# Patient Record
Sex: FEMALE | Race: WHITE | NOT HISPANIC OR LATINO | Employment: PART TIME | URBAN - METROPOLITAN AREA
[De-identification: names, ages, dates, MRNs, and addresses within clinical notes are randomized per-mention and may not be internally consistent; named-entity substitution may affect disease eponyms.]

---

## 2020-11-17 LAB
HBV SURFACE AG SERPL QL IA: NORMAL
HIV 1+2 AB+HIV1 P24 AG SERPL QL IA: NORMAL
RUBELLA ANTIBODY IGG QUANTITATIVE: NORMAL IU/ML

## 2021-03-23 ENCOUNTER — HOSPITAL ENCOUNTER (EMERGENCY)
Facility: CLINIC | Age: 31
End: 2021-03-23

## 2021-03-23 ENCOUNTER — APPOINTMENT (OUTPATIENT)
Dept: ULTRASOUND IMAGING | Facility: CLINIC | Age: 31
End: 2021-03-23
Attending: OBSTETRICS & GYNECOLOGY
Payer: COMMERCIAL

## 2021-03-23 ENCOUNTER — HOSPITAL ENCOUNTER (OUTPATIENT)
Facility: CLINIC | Age: 31
End: 2021-03-23
Admitting: OBSTETRICS & GYNECOLOGY
Payer: COMMERCIAL

## 2021-03-23 ENCOUNTER — HOSPITAL ENCOUNTER (OUTPATIENT)
Facility: CLINIC | Age: 31
Discharge: HOME | End: 2021-03-23
Attending: OBSTETRICS & GYNECOLOGY | Admitting: OBSTETRICS & GYNECOLOGY
Payer: COMMERCIAL

## 2021-03-23 VITALS
SYSTOLIC BLOOD PRESSURE: 97 MMHG | WEIGHT: 130 LBS | TEMPERATURE: 98.8 F | RESPIRATION RATE: 18 BRPM | DIASTOLIC BLOOD PRESSURE: 58 MMHG | HEART RATE: 78 BPM

## 2021-03-23 PROBLEM — Z36.89 ENCOUNTER FOR TRIAGE IN PREGNANT PATIENT: Status: ACTIVE | Noted: 2021-03-23

## 2021-03-23 LAB
ALBUMIN UR-MCNC: NEGATIVE MG/DL
APPEARANCE UR: CLEAR
BACTERIA #/AREA URNS HPF: ABNORMAL /HPF
BILIRUB UR QL STRIP: NEGATIVE
COLOR UR AUTO: ABNORMAL
GLUCOSE UR STRIP-MCNC: NEGATIVE MG/DL
HGB UR QL STRIP: NEGATIVE
KETONES UR STRIP-MCNC: 10 MG/DL
LEUKOCYTE ESTERASE UR QL STRIP: ABNORMAL
NITRATE UR QL: NEGATIVE
PH UR STRIP: 7 PH (ref 5–7)
RBC #/AREA URNS AUTO: 1 /HPF (ref 0–2)
SOURCE: ABNORMAL
SP GR UR STRIP: 1.01 (ref 1–1.03)
UROBILINOGEN UR STRIP-MCNC: NORMAL MG/DL (ref 0–2)
WBC #/AREA URNS AUTO: 4 /HPF (ref 0–5)

## 2021-03-23 PROCEDURE — 999N000105 HC STATISTIC NO DOCUMENTATION TO SUPPORT CHARGE

## 2021-03-23 PROCEDURE — 258N000003 HC RX IP 258 OP 636: Performed by: OBSTETRICS & GYNECOLOGY

## 2021-03-23 PROCEDURE — 81001 URINALYSIS AUTO W/SCOPE: CPT | Performed by: OBSTETRICS & GYNECOLOGY

## 2021-03-23 PROCEDURE — 96360 HYDRATION IV INFUSION INIT: CPT

## 2021-03-23 PROCEDURE — 96374 THER/PROPH/DIAG INJ IV PUSH: CPT

## 2021-03-23 PROCEDURE — 250N000011 HC RX IP 250 OP 636: Performed by: OBSTETRICS & GYNECOLOGY

## 2021-03-23 PROCEDURE — 76705 ECHO EXAM OF ABDOMEN: CPT

## 2021-03-23 PROCEDURE — G0463 HOSPITAL OUTPT CLINIC VISIT: HCPCS | Mod: 25

## 2021-03-23 RX ORDER — SODIUM CHLORIDE, SODIUM LACTATE, POTASSIUM CHLORIDE, CALCIUM CHLORIDE 600; 310; 30; 20 MG/100ML; MG/100ML; MG/100ML; MG/100ML
INJECTION, SOLUTION INTRAVENOUS CONTINUOUS
Status: DISCONTINUED | OUTPATIENT
Start: 2021-03-23 | End: 2021-03-24 | Stop reason: HOSPADM

## 2021-03-23 RX ORDER — POLYETHYLENE GLYCOL 3350 17 G/17G
1 POWDER, FOR SOLUTION ORAL DAILY
COMMUNITY

## 2021-03-23 RX ORDER — ONDANSETRON 2 MG/ML
4 INJECTION INTRAMUSCULAR; INTRAVENOUS EVERY 6 HOURS PRN
Status: DISCONTINUED | OUTPATIENT
Start: 2021-03-23 | End: 2021-03-24 | Stop reason: HOSPADM

## 2021-03-23 RX ORDER — PRENATAL VIT/IRON FUM/FOLIC AC 27MG-0.8MG
1 TABLET ORAL DAILY
COMMUNITY
End: 2024-08-08

## 2021-03-23 RX ADMIN — SODIUM CHLORIDE, POTASSIUM CHLORIDE, SODIUM LACTATE AND CALCIUM CHLORIDE 1000 ML: 600; 310; 30; 20 INJECTION, SOLUTION INTRAVENOUS at 21:00

## 2021-03-23 RX ADMIN — ONDANSETRON 4 MG: 2 INJECTION INTRAMUSCULAR; INTRAVENOUS at 21:00

## 2021-03-23 SDOH — HEALTH STABILITY: MENTAL HEALTH: HOW OFTEN DO YOU HAVE A DRINK CONTAINING ALCOHOL?: NEVER

## 2021-03-24 NOTE — PLAN OF CARE
Data: Patient assessed in the [facility] for abdominal pain.  Cervical exam not examined.  Membranes intact.  Contractions none.  Action:  Presumed adequate fetal oxygenation documented (see flow record). Discharge instructions reviewed.  Patient instructed to report change in fetal movement, vaginal leaking of fluid or bleeding, abdominal pain, or any concerns related to the pregnancy to her nurse/physician.    Response: Orders to discharge home per Moody Green.  Patient verbalized understanding of education and verbalized agreement with plan. Discharged to home.

## 2021-03-24 NOTE — PROVIDER NOTIFICATION
03/23/21 2200   Provider Notification   Provider Name/Title Dr. Green   Method of Notification Phone   Request Evaluate - Remote   Notification Reason Lab/Diagnostic Study   MD notified of UA and ultrasound results and updated that pt is feeling a lot better.  Iv fluids are running and zofran was given.  Orders received that after IV fluids pt may be discharged to home and to follow up this week in the clinic.

## 2021-03-24 NOTE — PROVIDER NOTIFICATION
21   Provider Notification   Provider Name/Title Dr. Green   Method of Notification Phone   Request Evaluate - Remote   Notification Reason Patient Arrived;Pain   Data: Patient presented to [facility]: 3/23/2021  7:05 PM.  Reason for maternal/fetal assessment is abdominal pain. Patient reports that she started having really bad abdominal pain around 3:30pm and has thrown up about 6 times.  Pt states that she had some gallbladder issues with her last pregnancy around 7 months.  Pt states that about 4 weeks ago she mentioned to the doctor that she would have abdominal pain after eating..  Patient is a .  Prenatal record reviewed. Pregnancy has been uncomplicated..  Gestational Age 28w1d. VSS. Fetal movement present. Patient denies uterine contractions, leaking of vaginal fluid/rupture of membranes, vaginal bleeding, pelvic pressure, headache, visual disturbances, significant edema. Support person is present.   Action: Verbal consent for EFM. Triage assessment completed. Bill of rights reviewed.  Response: Patient verbalized agreement with plan. Will contact Dr Moody Green with update and further orders.  Orders received to do a Abdominal Ultrasound of RUQ, give a liter of fluids and some iv zofran and update with results.

## 2021-05-17 LAB — GROUP B STREP PCR: NEGATIVE

## 2021-05-24 ENCOUNTER — HOSPITAL ENCOUNTER (OUTPATIENT)
Facility: CLINIC | Age: 31
Discharge: HOME | End: 2021-05-24
Attending: OBSTETRICS & GYNECOLOGY | Admitting: OBSTETRICS & GYNECOLOGY
Payer: COMMERCIAL

## 2021-05-24 VITALS — RESPIRATION RATE: 16 BRPM | TEMPERATURE: 98.9 F

## 2021-05-24 LAB
ALBUMIN UR-MCNC: NEGATIVE MG/DL
APPEARANCE UR: CLEAR
BACTERIA #/AREA URNS HPF: ABNORMAL /HPF
BILIRUB UR QL STRIP: NEGATIVE
COLOR UR AUTO: ABNORMAL
GLUCOSE UR STRIP-MCNC: NEGATIVE MG/DL
HGB UR QL STRIP: NEGATIVE
KETONES UR STRIP-MCNC: NEGATIVE MG/DL
LEUKOCYTE ESTERASE UR QL STRIP: ABNORMAL
MUCOUS THREADS #/AREA URNS LPF: PRESENT /LPF
NITRATE UR QL: NEGATIVE
PH UR STRIP: 6 PH (ref 5–7)
RBC #/AREA URNS AUTO: 1 /HPF (ref 0–2)
SOURCE: ABNORMAL
SP GR UR STRIP: 1 (ref 1–1.03)
SQUAMOUS #/AREA URNS AUTO: 1 /HPF (ref 0–1)
UROBILINOGEN UR STRIP-MCNC: NORMAL MG/DL (ref 0–2)
WBC #/AREA URNS AUTO: 4 /HPF (ref 0–5)

## 2021-05-24 PROCEDURE — 59025 FETAL NON-STRESS TEST: CPT

## 2021-05-24 PROCEDURE — 87086 URINE CULTURE/COLONY COUNT: CPT | Performed by: OBSTETRICS & GYNECOLOGY

## 2021-05-24 PROCEDURE — 87186 SC STD MICRODIL/AGAR DIL: CPT | Performed by: OBSTETRICS & GYNECOLOGY

## 2021-05-24 PROCEDURE — G0463 HOSPITAL OUTPT CLINIC VISIT: HCPCS | Mod: 25

## 2021-05-24 PROCEDURE — 87088 URINE BACTERIA CULTURE: CPT | Performed by: OBSTETRICS & GYNECOLOGY

## 2021-05-24 PROCEDURE — 81001 URINALYSIS AUTO W/SCOPE: CPT | Performed by: OBSTETRICS & GYNECOLOGY

## 2021-05-24 NOTE — DISCHARGE INSTRUCTIONS
Discharge Instruction for Undelivered Patients      You were seen for: Back pain, leg pain/swelling, arm/hand pain and numbness. Decreased fetal movement.  We Consulted: Dr. Shea  You had (Test or Medicine):Fetal and uterine monitoring, urine assessment.      Diet:   Drink 8 to 12 glasses of liquids (milk, juice, water) every day.  You may eat meals and snacks.     Activity:  Count fetal kicks everyday (see handout)  Call your doctor or nurse midwife if your baby is moving less than usual.     Call your provider if you notice:  Swelling in your face or increased swelling in your hands or legs.  Headaches that are not relieved by Tylenol (acetaminophen).  Changes in your vision (blurring: seeing spots or stars.)  Nausea (sick to your stomach) and vomiting (throwing up).   Weight gain of 5 pounds or more per week.  Heartburn that doesn't go away.  Signs of bladder infection: pain when you urinate (use the toilet), need to go more often and more urgently.  The bag of carvajal (rupture of membranes) breaks, or you notice leaking in your underwear.  Bright red blood in your underwear.  Abdominal (lower belly) or stomach pain.  Second (plus) baby: Contractions (tightening) less than 10 minutes apart and getting stronger.  Increase or change in vaginal discharge (note the color and amount)      Follow-up:  Make an appointment to be seen this week, at this appointment your doctor with discuss physical therapy needs.   Use ice on wrists.  Have someone put on your compression stockings for you.  Purchase wrist splints from pharmacy or [employer] to wear for support until your appointment.

## 2021-05-24 NOTE — PLAN OF CARE
Data: Patient presented to [facility]: 2021 11:40 AM.  Reason for maternal/fetal assessment is back pain, leg pain, numbness in arms/hands, and decreased fetal movement. Patient reports constant centralized back pain that is causing shooting pain down her legs, experiencing pain and numbness in her arms/hands, and she is also experiencing swelling in her feet/ankles-this is noted to be +2-+3 pitting edema. Patient reports her baby is moving, just not as much as she is used to. Patient states she does have compression stockings but has been struggling with dexterity and hand pain due to the hand numbness when attempting to put these on. Patient is a .  Prenatal record reviewed. Pregnancy has been uncomplicated. Patient is currently being treated for a UTI, is on day 4 of antibiotics, back pain description is not consistent with flank pain.  Gestational Age 37w0d. VSS. Fetal movement decreased since this morning. Patient denies uterine contractions, leaking of vaginal fluid/rupture of membranes, vaginal bleeding, abdominal pain, pelvic pressure, nausea, vomiting, headache, visual disturbances, epigastric or URQ pain, significant edema. Support person is not present.   Action: Verbal consent for EFM. FHT's category 1. No UC's noted on monitor nor palpated. Triage assessment completed. Bill of rights reviewed.  Response: Patient verbalized agreement with plan. Dr. Shea updated on assessment data. Pain consistent with carpal tunnel, sciatica pain of pregnancy. Per MD, may discharge to home following reactive NST. Patient to make appointment for this week where she will be consulted with PT, patient to ice wrists, use wrist splints, and have someone put on her compression stockings for her in the meantime.

## 2021-05-25 LAB
BACTERIA SPEC CULT: ABNORMAL
Lab: ABNORMAL
SPECIMEN SOURCE: ABNORMAL

## 2021-06-01 ENCOUNTER — APPOINTMENT (OUTPATIENT)
Dept: INTERPRETER SERVICES | Facility: CLINIC | Age: 31
End: 2021-06-01
Payer: COMMERCIAL

## 2021-06-01 DIAGNOSIS — Z33.1 PREGNANCY, INCIDENTAL: Primary | ICD-10-CM

## 2021-06-03 DIAGNOSIS — Z33.1 PREGNANCY, INCIDENTAL: ICD-10-CM

## 2021-06-03 LAB
LABORATORY COMMENT REPORT: NORMAL
SARS-COV-2 RNA RESP QL NAA+PROBE: NEGATIVE
SARS-COV-2 RNA RESP QL NAA+PROBE: NORMAL
SPECIMEN SOURCE: NORMAL
SPECIMEN SOURCE: NORMAL

## 2021-06-03 PROCEDURE — U0005 INFEC AGEN DETEC AMPLI PROBE: HCPCS | Performed by: OBSTETRICS & GYNECOLOGY

## 2021-06-03 PROCEDURE — U0003 INFECTIOUS AGENT DETECTION BY NUCLEIC ACID (DNA OR RNA); SEVERE ACUTE RESPIRATORY SYNDROME CORONAVIRUS 2 (SARS-COV-2) (CORONAVIRUS DISEASE [COVID-19]), AMPLIFIED PROBE TECHNIQUE, MAKING USE OF HIGH THROUGHPUT TECHNOLOGIES AS DESCRIBED BY CMS-2020-01-R: HCPCS | Performed by: OBSTETRICS & GYNECOLOGY

## 2021-06-07 ENCOUNTER — ANESTHESIA EVENT (OUTPATIENT)
Dept: OBGYN | Facility: CLINIC | Age: 31
End: 2021-06-07
Payer: COMMERCIAL

## 2021-06-07 ENCOUNTER — ANESTHESIA (OUTPATIENT)
Dept: OBGYN | Facility: CLINIC | Age: 31
End: 2021-06-07
Payer: COMMERCIAL

## 2021-06-07 ENCOUNTER — HOSPITAL ENCOUNTER (INPATIENT)
Facility: CLINIC | Age: 31
LOS: 2 days | Discharge: HOME | End: 2021-06-09
Attending: OBSTETRICS & GYNECOLOGY | Admitting: OBSTETRICS & GYNECOLOGY
Payer: COMMERCIAL

## 2021-06-07 LAB
ABO + RH BLD: NORMAL
ABO + RH BLD: NORMAL
HGB BLD-MCNC: 13.8 G/DL (ref 11.7–15.7)
SPECIMEN EXP DATE BLD: NORMAL
T PALLIDUM AB SER QL: NONREACTIVE

## 2021-06-07 PROCEDURE — 86780 TREPONEMA PALLIDUM: CPT | Performed by: OBSTETRICS & GYNECOLOGY

## 2021-06-07 PROCEDURE — 258N000003 HC RX IP 258 OP 636: Performed by: OBSTETRICS & GYNECOLOGY

## 2021-06-07 PROCEDURE — 85018 HEMOGLOBIN: CPT | Performed by: OBSTETRICS & GYNECOLOGY

## 2021-06-07 PROCEDURE — 250N000009 HC RX 250: Performed by: OBSTETRICS & GYNECOLOGY

## 2021-06-07 PROCEDURE — 120N000001 HC R&B MED SURG/OB

## 2021-06-07 PROCEDURE — 86901 BLOOD TYPING SEROLOGIC RH(D): CPT | Performed by: OBSTETRICS & GYNECOLOGY

## 2021-06-07 PROCEDURE — 3E0R3BZ INTRODUCTION OF ANESTHETIC AGENT INTO SPINAL CANAL, PERCUTANEOUS APPROACH: ICD-10-PCS | Performed by: ANESTHESIOLOGY

## 2021-06-07 PROCEDURE — 86900 BLOOD TYPING SEROLOGIC ABO: CPT | Performed by: OBSTETRICS & GYNECOLOGY

## 2021-06-07 PROCEDURE — 250N000011 HC RX IP 250 OP 636: Performed by: ANESTHESIOLOGY

## 2021-06-07 PROCEDURE — 00HU33Z INSERTION OF INFUSION DEVICE INTO SPINAL CANAL, PERCUTANEOUS APPROACH: ICD-10-PCS | Performed by: ANESTHESIOLOGY

## 2021-06-07 PROCEDURE — 370N000003 HC ANESTHESIA WARD SERVICE

## 2021-06-07 RX ORDER — NALOXONE HYDROCHLORIDE 0.4 MG/ML
0.2 INJECTION, SOLUTION INTRAMUSCULAR; INTRAVENOUS; SUBCUTANEOUS
Status: DISCONTINUED | OUTPATIENT
Start: 2021-06-07 | End: 2021-06-08

## 2021-06-07 RX ORDER — TRANEXAMIC ACID 10 MG/ML
1 INJECTION, SOLUTION INTRAVENOUS EVERY 30 MIN PRN
Status: DISCONTINUED | OUTPATIENT
Start: 2021-06-07 | End: 2021-06-08

## 2021-06-07 RX ORDER — ONDANSETRON 4 MG/1
4 TABLET, ORALLY DISINTEGRATING ORAL EVERY 6 HOURS PRN
Status: DISCONTINUED | OUTPATIENT
Start: 2021-06-07 | End: 2021-06-08

## 2021-06-07 RX ORDER — METHYLERGONOVINE MALEATE 0.2 MG/ML
200 INJECTION INTRAVENOUS
Status: DISCONTINUED | OUTPATIENT
Start: 2021-06-07 | End: 2021-06-08

## 2021-06-07 RX ORDER — IBUPROFEN 800 MG/1
800 TABLET, FILM COATED ORAL
Status: COMPLETED | OUTPATIENT
Start: 2021-06-07 | End: 2021-06-08

## 2021-06-07 RX ORDER — BUPIVACAINE HYDROCHLORIDE 2.5 MG/ML
INJECTION, SOLUTION EPIDURAL; INFILTRATION; INTRACAUDAL PRN
Status: DISCONTINUED | OUTPATIENT
Start: 2021-06-07 | End: 2021-06-08

## 2021-06-07 RX ORDER — SODIUM CHLORIDE, SODIUM LACTATE, POTASSIUM CHLORIDE, CALCIUM CHLORIDE 600; 310; 30; 20 MG/100ML; MG/100ML; MG/100ML; MG/100ML
INJECTION, SOLUTION INTRAVENOUS CONTINUOUS
Status: DISCONTINUED | OUTPATIENT
Start: 2021-06-07 | End: 2021-06-08

## 2021-06-07 RX ORDER — ACETAMINOPHEN 325 MG/1
650 TABLET ORAL EVERY 4 HOURS PRN
Status: DISCONTINUED | OUTPATIENT
Start: 2021-06-07 | End: 2021-06-08

## 2021-06-07 RX ORDER — ONDANSETRON 2 MG/ML
4 INJECTION INTRAMUSCULAR; INTRAVENOUS EVERY 6 HOURS PRN
Status: DISCONTINUED | OUTPATIENT
Start: 2021-06-07 | End: 2021-06-08

## 2021-06-07 RX ORDER — OXYTOCIN/0.9 % SODIUM CHLORIDE 30/500 ML
100-340 PLASTIC BAG, INJECTION (ML) INTRAVENOUS CONTINUOUS PRN
Status: COMPLETED | OUTPATIENT
Start: 2021-06-07 | End: 2021-06-08

## 2021-06-07 RX ORDER — EPHEDRINE SULFATE 50 MG/ML
5 INJECTION, SOLUTION INTRAMUSCULAR; INTRAVENOUS; SUBCUTANEOUS
Status: DISCONTINUED | OUTPATIENT
Start: 2021-06-07 | End: 2021-06-08

## 2021-06-07 RX ORDER — FENTANYL/BUPIVACAINE/NS/PF 2-1250MCG
PLASTIC BAG, INJECTION (ML) INJECTION
Status: DISCONTINUED
Start: 2021-06-07 | End: 2021-06-08 | Stop reason: WASHOUT

## 2021-06-07 RX ORDER — CARBOPROST TROMETHAMINE 250 UG/ML
250 INJECTION, SOLUTION INTRAMUSCULAR
Status: DISCONTINUED | OUTPATIENT
Start: 2021-06-07 | End: 2021-06-08

## 2021-06-07 RX ORDER — OXYTOCIN/0.9 % SODIUM CHLORIDE 30/500 ML
1-24 PLASTIC BAG, INJECTION (ML) INTRAVENOUS CONTINUOUS
Status: DISCONTINUED | OUTPATIENT
Start: 2021-06-07 | End: 2021-06-08

## 2021-06-07 RX ORDER — FENTANYL/BUPIVACAINE/NS/PF 2-1250MCG
PLASTIC BAG, INJECTION (ML) INJECTION
Status: COMPLETED
Start: 2021-06-07 | End: 2021-06-08

## 2021-06-07 RX ORDER — FENTANYL CITRATE 50 UG/ML
50-100 INJECTION, SOLUTION INTRAMUSCULAR; INTRAVENOUS
Status: DISCONTINUED | OUTPATIENT
Start: 2021-06-07 | End: 2021-06-08

## 2021-06-07 RX ORDER — OXYTOCIN 10 [USP'U]/ML
10 INJECTION, SOLUTION INTRAMUSCULAR; INTRAVENOUS
Status: DISCONTINUED | OUTPATIENT
Start: 2021-06-07 | End: 2021-06-08

## 2021-06-07 RX ORDER — NALOXONE HYDROCHLORIDE 0.4 MG/ML
0.4 INJECTION, SOLUTION INTRAMUSCULAR; INTRAVENOUS; SUBCUTANEOUS
Status: DISCONTINUED | OUTPATIENT
Start: 2021-06-07 | End: 2021-06-08

## 2021-06-07 RX ORDER — EPHEDRINE SULFATE 50 MG/ML
INJECTION, SOLUTION INTRAMUSCULAR; INTRAVENOUS; SUBCUTANEOUS
Status: DISCONTINUED
Start: 2021-06-07 | End: 2021-06-08 | Stop reason: WASHOUT

## 2021-06-07 RX ORDER — NALBUPHINE HYDROCHLORIDE 10 MG/ML
2.5-5 INJECTION, SOLUTION INTRAMUSCULAR; INTRAVENOUS; SUBCUTANEOUS EVERY 6 HOURS PRN
Status: DISCONTINUED | OUTPATIENT
Start: 2021-06-07 | End: 2021-06-08

## 2021-06-07 RX ORDER — ONDANSETRON 2 MG/ML
4 INJECTION INTRAMUSCULAR; INTRAVENOUS EVERY 6 HOURS PRN
Status: DISCONTINUED | OUTPATIENT
Start: 2021-06-07 | End: 2021-06-07

## 2021-06-07 RX ORDER — OXYCODONE AND ACETAMINOPHEN 5; 325 MG/1; MG/1
1 TABLET ORAL
Status: DISCONTINUED | OUTPATIENT
Start: 2021-06-07 | End: 2021-06-08

## 2021-06-07 RX ADMIN — SODIUM CHLORIDE, POTASSIUM CHLORIDE, SODIUM LACTATE AND CALCIUM CHLORIDE 500 ML: 600; 310; 30; 20 INJECTION, SOLUTION INTRAVENOUS at 10:27

## 2021-06-07 RX ADMIN — SODIUM CHLORIDE, POTASSIUM CHLORIDE, SODIUM LACTATE AND CALCIUM CHLORIDE: 600; 310; 30; 20 INJECTION, SOLUTION INTRAVENOUS at 23:26

## 2021-06-07 RX ADMIN — SODIUM CHLORIDE, POTASSIUM CHLORIDE, SODIUM LACTATE AND CALCIUM CHLORIDE: 600; 310; 30; 20 INJECTION, SOLUTION INTRAVENOUS at 18:55

## 2021-06-07 RX ADMIN — SODIUM CHLORIDE, POTASSIUM CHLORIDE, SODIUM LACTATE AND CALCIUM CHLORIDE: 600; 310; 30; 20 INJECTION, SOLUTION INTRAVENOUS at 09:43

## 2021-06-07 RX ADMIN — Medication 2 MILLI-UNITS/MIN: at 09:57

## 2021-06-07 RX ADMIN — BUPIVACAINE HYDROCHLORIDE 15 ML: 2.5 INJECTION, SOLUTION EPIDURAL; INFILTRATION; INTRACAUDAL at 23:27

## 2021-06-07 RX ADMIN — SODIUM CHLORIDE, POTASSIUM CHLORIDE, SODIUM LACTATE AND CALCIUM CHLORIDE: 600; 310; 30; 20 INJECTION, SOLUTION INTRAVENOUS at 13:07

## 2021-06-07 ASSESSMENT — MIFFLIN-ST. JEOR: SCORE: 1334.58

## 2021-06-07 NOTE — PLAN OF CARE
31year old  at 39.0 weeks gestation presents to L/D for evaluation of IOL. Patient reports gall stones present, but for the last 3 days they have not bothered pt. Patient states no complications with first delivery, yet was in [place]. Patient reports good fetal movement, denies leaking of fluid, vaginal bleeding, and regular contractions. EFM and toco explained and applied. Health history obtained, physical assessment completed. Will update  and obtain further orders.

## 2021-06-07 NOTE — PROVIDER NOTIFICATION
06/07/21 0927   Provider Notification   Provider Name/Title Dr. Tucker   Method of Notification At Bedside     Discussed poc, pt request to begin pitocin first then AROM later. fhts cat 1 tracing, pt contrx q2-5min, pt not feeling. gbs-.  Dr. Tucker stated will round back on pt later to arom.  ipad used.

## 2021-06-07 NOTE — PROVIDER NOTIFICATION
06/07/21 1716   Provider Notification   Provider Name/Title Dr. Tucker   Method of Notification Phone   Notification Reason Status Update     Updated of pt discomfort, contrx palpate moderate, pt is slightly uncomfortable, fht's majority cat 1tracing, intermittent variable noted, contrx q1-4min and irregular, pitocin increased slowly and currently @4mu/hr, pt encouraged to get up and move around, has utilized the rocking chair and movement and bedside, prefers to be in bed majority of time. sve has not been checked since 1415.  Dr. Hughes will be assuming cares at this time, per Dr. Tucker.  Dr. Hughes called unit and was updated of the following @1727.

## 2021-06-07 NOTE — H&P
Jaxon Dempsey  [id number]  OB Admit History & Physical      HPI:  Ms. Dempsey  is a 31 year old  @ 39w0d by ANAND who presented to L&D for induction of labor for RUQ due to cholelithiasis.      Prenatal course:  1st visit at 10 weeks, regular care, TWG 50#.    Pregnancy complications:  Cholelithiasis, abnormal 1 hour gtt, normal 3 hour.    Prenatal labs:  A+, antibody screen negative,  Rubella  Immune, Hep B/HIV/RPR all negative, GC/CT negative, ; 83, 106,115,100,  GBS neg; genetic screening tests declined.     OB history:   OB History    Para Term  AB Living   2 1 1 0 0 1   SAB TAB Ectopic Multiple Live Births   0 0 0 0 0      # Outcome Date GA Lbr Wood/2nd Weight Sex Delivery Anes PTL Lv   2 Current            1 Term 2017     Vag-Spont            PMHx:     Past Medical History:   Diagnosis Date     Gall stones        PSHX:  History reviewed. No pertinent surgical history.    Meds:    No current outpatient medications on file.       Allergies: Patient has no known allergies.      REVIEW OF SYSTEMS:  Positives and negatives in HPI.     SocHx:    Social History     Socioeconomic History     Marital status:      Spouse name: Not on file     Number of children: Not on file     Years of education: Not on file     Highest education level: Not on file   Occupational History     Not on file   Social Needs     Financial resource strain: Not on file     Food insecurity     Worry: Not on file     Inability: Not on file     Transportation needs     Medical: Not on file     Non-medical: Not on file   Tobacco Use     Smoking status: Never Smoker     Smokeless tobacco: Never Used   Substance and Sexual Activity     Alcohol use: Never     Frequency: Never     Drug use: Never     Sexual activity: Yes   Lifestyle     Physical activity     Days per week: Not on file     Minutes per session: Not on file     Stress: Not on file   Relationships     Social connections     Talks on phone: Not on file      Gets together: Not on file     Attends Tenriism service: Not on file     Active member of club or organization: Not on file     Attends meetings of clubs or organizations: Not on file     Relationship status: Not on file     Intimate partner violence     Fear of current or ex partner: Not on file     Emotionally abused: Not on file     Physically abused: Not on file     Forced sexual activity: Not on file   Other Topics Concern     Not on file   Social History Narrative     Not on file        Fam Hx:  History reviewed. No pertinent family history.      PHYSICAL EXAM:      Vitals:  /71   Pulse 89   Temp 99.6  F (37.6  C) (Oral)   Resp 16   Ht 1.524 m (5')   Wt 70.3 kg (155 lb)   BMI 30.27 kg/m    Alert Awake in NAD  ABD gravid, non-tender, EFW 7 1/2#  Cervix:  4 cm / 70 % effaced at -2 station, membranes AROM, fluid clear  EFM:  Baseline 130, with moderate variability, accels to present, no decels  Maxwell: contractions q1-3 min Tachysystole with pitocin. Pt initially declined AROM and only wanted Pitocin.     Assessment:  IUP at 39w0d admitted for induction for RUQ pain due to cholelithiasis. Pt had tachysystole with Pitocin and now agreed to AROM.    Plan:  Admission            Continuous fetal and uterine monitoring            Analgesia-Pt declines pain medication at this time.             The plan of care was discussed with the patient.   per ipad used. She expressed understanding and agreement.             Anticipate TONY Tucker MD   Dept of OB/GYN  2021

## 2021-06-07 NOTE — PROVIDER NOTIFICATION
06/07/21 1104   Provider Notification   Provider Name/Title Dr. Tucker   Method of Notification Electronic Page  (via web based paging)     UPDATE: I paged to update you that I started the pit, and pt contrx became tackysystole shortly after, so I had to turn pit off and doing iv fluid bolus. Currently  contrx 1-4.  Call me when you have a min, discuss poc, maybe arom will be best option?

## 2021-06-08 PROCEDURE — 250N000011 HC RX IP 250 OP 636

## 2021-06-08 PROCEDURE — 999N000079 HC STATISTIC IP LACTATION SERVICES 1-15 MIN

## 2021-06-08 PROCEDURE — 250N000013 HC RX MED GY IP 250 OP 250 PS 637: Performed by: OBSTETRICS & GYNECOLOGY

## 2021-06-08 PROCEDURE — 250N000009 HC RX 250: Performed by: OBSTETRICS & GYNECOLOGY

## 2021-06-08 PROCEDURE — 0KQM0ZZ REPAIR PERINEUM MUSCLE, OPEN APPROACH: ICD-10-PCS | Performed by: OBSTETRICS & GYNECOLOGY

## 2021-06-08 PROCEDURE — 722N000001 HC LABOR CARE VAGINAL DELIVERY SINGLE

## 2021-06-08 PROCEDURE — 3E033VJ INTRODUCTION OF OTHER HORMONE INTO PERIPHERAL VEIN, PERCUTANEOUS APPROACH: ICD-10-PCS | Performed by: OBSTETRICS & GYNECOLOGY

## 2021-06-08 PROCEDURE — 120N000001 HC R&B MED SURG/OB

## 2021-06-08 PROCEDURE — 10907ZC DRAINAGE OF AMNIOTIC FLUID, THERAPEUTIC FROM PRODUCTS OF CONCEPTION, VIA NATURAL OR ARTIFICIAL OPENING: ICD-10-PCS | Performed by: OBSTETRICS & GYNECOLOGY

## 2021-06-08 RX ORDER — CARBOPROST TROMETHAMINE 250 UG/ML
250 INJECTION, SOLUTION INTRAMUSCULAR
Status: DISCONTINUED | OUTPATIENT
Start: 2021-06-08 | End: 2021-06-09 | Stop reason: HOSPADM

## 2021-06-08 RX ORDER — OXYTOCIN/0.9 % SODIUM CHLORIDE 30/500 ML
100 PLASTIC BAG, INJECTION (ML) INTRAVENOUS CONTINUOUS
Status: DISCONTINUED | OUTPATIENT
Start: 2021-06-08 | End: 2021-06-09 | Stop reason: HOSPADM

## 2021-06-08 RX ORDER — HYDROCORTISONE 2.5 %
CREAM (GRAM) TOPICAL 3 TIMES DAILY PRN
Status: DISCONTINUED | OUTPATIENT
Start: 2021-06-08 | End: 2021-06-09 | Stop reason: HOSPADM

## 2021-06-08 RX ORDER — METHYLERGONOVINE MALEATE 0.2 MG/ML
200 INJECTION INTRAVENOUS
Status: DISCONTINUED | OUTPATIENT
Start: 2021-06-08 | End: 2021-06-09 | Stop reason: HOSPADM

## 2021-06-08 RX ORDER — OXYTOCIN 10 [USP'U]/ML
10 INJECTION, SOLUTION INTRAMUSCULAR; INTRAVENOUS
Status: DISCONTINUED | OUTPATIENT
Start: 2021-06-08 | End: 2021-06-09 | Stop reason: HOSPADM

## 2021-06-08 RX ORDER — AMOXICILLIN 250 MG
2 CAPSULE ORAL 2 TIMES DAILY
Status: DISCONTINUED | OUTPATIENT
Start: 2021-06-08 | End: 2021-06-09 | Stop reason: HOSPADM

## 2021-06-08 RX ORDER — BISACODYL 10 MG
10 SUPPOSITORY, RECTAL RECTAL DAILY PRN
Status: DISCONTINUED | OUTPATIENT
Start: 2021-06-10 | End: 2021-06-09 | Stop reason: HOSPADM

## 2021-06-08 RX ORDER — AMOXICILLIN 250 MG
1 CAPSULE ORAL 2 TIMES DAILY
Status: DISCONTINUED | OUTPATIENT
Start: 2021-06-08 | End: 2021-06-09 | Stop reason: HOSPADM

## 2021-06-08 RX ORDER — MISOPROSTOL 200 UG/1
800 TABLET ORAL
Status: DISCONTINUED | OUTPATIENT
Start: 2021-06-08 | End: 2021-06-09 | Stop reason: HOSPADM

## 2021-06-08 RX ORDER — IBUPROFEN 800 MG/1
800 TABLET, FILM COATED ORAL EVERY 6 HOURS PRN
Status: DISCONTINUED | OUTPATIENT
Start: 2021-06-08 | End: 2021-06-09 | Stop reason: HOSPADM

## 2021-06-08 RX ORDER — MODIFIED LANOLIN
OINTMENT (GRAM) TOPICAL
Status: DISCONTINUED | OUTPATIENT
Start: 2021-06-08 | End: 2021-06-09 | Stop reason: HOSPADM

## 2021-06-08 RX ORDER — TRANEXAMIC ACID 10 MG/ML
1 INJECTION, SOLUTION INTRAVENOUS EVERY 30 MIN PRN
Status: DISCONTINUED | OUTPATIENT
Start: 2021-06-08 | End: 2021-06-09 | Stop reason: HOSPADM

## 2021-06-08 RX ORDER — OXYTOCIN/0.9 % SODIUM CHLORIDE 30/500 ML
340 PLASTIC BAG, INJECTION (ML) INTRAVENOUS CONTINUOUS PRN
Status: DISCONTINUED | OUTPATIENT
Start: 2021-06-08 | End: 2021-06-09 | Stop reason: HOSPADM

## 2021-06-08 RX ORDER — ACETAMINOPHEN 325 MG/1
650 TABLET ORAL EVERY 4 HOURS PRN
Status: DISCONTINUED | OUTPATIENT
Start: 2021-06-08 | End: 2021-06-09 | Stop reason: HOSPADM

## 2021-06-08 RX ADMIN — IBUPROFEN 800 MG: 800 TABLET, FILM COATED ORAL at 20:54

## 2021-06-08 RX ADMIN — ACETAMINOPHEN 650 MG: 325 TABLET, FILM COATED ORAL at 07:54

## 2021-06-08 RX ADMIN — Medication 10 ML/HR: at 00:34

## 2021-06-08 RX ADMIN — Medication 100 ML/HR: at 06:37

## 2021-06-08 RX ADMIN — ACETAMINOPHEN 650 MG: 325 TABLET, FILM COATED ORAL at 21:12

## 2021-06-08 RX ADMIN — ACETAMINOPHEN 650 MG: 325 TABLET, FILM COATED ORAL at 15:14

## 2021-06-08 RX ADMIN — DOCUSATE SODIUM 50 MG AND SENNOSIDES 8.6 MG 2 TABLET: 8.6; 5 TABLET, FILM COATED ORAL at 20:55

## 2021-06-08 RX ADMIN — IBUPROFEN 800 MG: 800 TABLET, FILM COATED ORAL at 15:14

## 2021-06-08 RX ADMIN — IBUPROFEN 800 MG: 800 TABLET, FILM COATED ORAL at 04:03

## 2021-06-08 NOTE — PROGRESS NOTES
OB Post-partum Note  PPD# 0    S:  Patient doing well.  Pain controlled although she c/o HA this AM.  Voiding.  Bleeding is normal.  Breast and bottle feeding.    O:  /69   Pulse 72   Temp 98  F (36.7  C) (Axillary)   Resp 16   Ht 1.524 m (5')   Wt 70.3 kg (155 lb)   SpO2 100%   Breastfeeding yes  BMI 30.27 kg/m    Gen- A&O, NAD  Abd- Non-tender, fundus non tender and firm   Ext- non-tender, no calf pain    Hemoglobin   Date Value Ref Range Status   2021 13.8 11.7 - 15.7 g/dL Final     A+  Rubella Immune  covid neg    A/P: 31 year old  PPD# 0 s/p , IOL due to cholelithiasis.    1.  Routine post-partum cares  2.  Analgesia tylenol and ibuprofen  3.  Discharge  PPD 1-2  4.  The plan of care was discussed with the patient.  She expressed understanding and agreement  5.  Patient declined Tdap and flu shot this pregnancy      Brielle Candelario MD  2021  8:51 AM

## 2021-06-08 NOTE — PROVIDER NOTIFICATION
06/08/21 0045   Provider Notification   Provider Name/Title Dr. Hughes    Method of Notification Phone   Request Evaluate - Remote   Notification Reason Labor Status     Physician informed of pt's response to labor and SVE. She will head in for impending delivery. Elizabeth Salinas RN on 6/8/2021 at 12:49 AM

## 2021-06-08 NOTE — PLAN OF CARE
Data: Jaxon Dempsey transferred to post partum rm 426 via wheelchair at 0620. Baby transferred via parent's arms.    VSS. Pt continues to complain of a head ache. Ibuprofen administered. Pain improving. Pt feels HA is likely caused by exhaustion and hunger. Fundus firm. Bleeding light. 2nd degree laceration well approximated. Glo cares preformed. Ambulated to bathroom with stand by assist and voided without difficulty. Breast feeding declined. Pt educated on the importance of early breast stimulation to encourage breast milk production. Pt verbalized understanding, but is exhausted and would like to formula feed via bottle instead. Bonding well with baby.  is currently in [place]. Pt spoke to  via phone several times throughout the night.     Action: Receiving unit notified of transfer: Yes. Patient and family notified of room change. Will give report to day RN, who will assume cares. Belongings sent to receiving unit. Accompanied by Registered Nurse. Oriented patient to surroundings. Call light within reach. ID bands double-checked with receiving RN.     Response: Patient tolerated transfer and is stable. Elizabeth Salinas RN on 6/8/2021 at 7:10 AM

## 2021-06-08 NOTE — ANESTHESIA POSTPROCEDURE EVALUATION
Patient: Jaxon Dempsey    * No procedures listed *    Diagnosis:* No pre-op diagnosis entered *  Diagnosis Additional Information: No value filed.    Anesthesia Type:  Epidural    Note:  Disposition: Inpatient   Postop Pain Control: Uneventful            Sign Out: Well controlled pain   PONV: No   Neuro/Psych: Uneventful            Sign Out: Acceptable/Baseline neuro status   Airway/Respiratory: Uneventful            Sign Out: Acceptable/Baseline resp. status   CV/Hemodynamics: Uneventful            Sign Out: Acceptable CV status; No obvious hypovolemia; No obvious fluid overload   Other NRE: NONE   DID A NON-ROUTINE EVENT OCCUR? No           Last vitals:  Vitals:    06/08/21 0503 06/08/21 0518 06/08/21 0800   BP: 110/64 106/60 106/69   Pulse:   72   Resp:   16   Temp:   98  F (36.7  C)   SpO2:          Last vitals prior to Anesthesia Care Transfer:      Electronically Signed By: Nicholas Haider MD  June 8, 2021  8:17 AM

## 2021-06-08 NOTE — ANESTHESIA PROCEDURE NOTES
Epidural catheter Procedure Note  Pre-Procedure   Staff -        Anesthesiologist:  Chilo Ryan MD       Performed By: anesthesiologist  Procedure DocumentationProcedure: epidural catheter  Comments:  Pt seen and interviewed prior to epidural placement for labor analgesia.  This epidural is to be placed in anticipation of normal vaginal delivery.  Risk discussed and consent given prior to performance of procedure.  Time out consisting of identification of patient and desire for epidural analgesia was confirmed.  Sterile prep of lumbar spine was accomplished with povidone iodine three times.  L34 interspace was identified.  Local anesthetic infiltration with 1.5% lido with epi 1/200k was performed with 1.5 cc.  17 G Tuohy needle was advanced in the midline with loss of resistance technique.  No CSF, blood, or paresthesias were noted with placement.  Test dose of 1.5% Lidocaine with epi 1/200k, 3 cc., was injected without evidence of intrathecal or intravascular injection.  Incremental bolus of 0.25% Bupivicaine was injected to a total volume of 15 cc.  Epidural cath 19 G was advanced through needle approx. 6 cm.  No paresthesia was noted with placement and aspiration of cath was negative for blood.  Catheter secured with tegaderm and tape.  Epidural infusion begun after double check of pump settings.  Nursing to inform if there are any complications, but none were noted prior to leaving patient room.  I, or my partners, remain immediately available for management of any issues or complications.  I, or my partners, will monitor at appropriate intervals.  ASH Ryan MD

## 2021-06-08 NOTE — LACTATION NOTE
LC attempted to visit patient throughout the day, but she was sleeping each time writer available. No concerns noted by primary nurse and patient has been mostly formula feeding  at this point.

## 2021-06-08 NOTE — ANESTHESIA PREPROCEDURE EVALUATION
Anesthesia Pre-Procedure Evaluation    Patient: Jaxon Dempsey   MRN: [id number] : 1990        Preoperative Diagnosis: * No surgery found *   Procedure :      Past Medical History:   Diagnosis Date     Gall stones       History reviewed. No pertinent surgical history.   No Known Allergies   Social History     Tobacco Use     Smoking status: Never Smoker     Smokeless tobacco: Never Used   Substance Use Topics     Alcohol use: Never     Frequency: Never      Wt Readings from Last 1 Encounters:   21 70.3 kg (155 lb)        Anesthesia Evaluation   Pt has had prior anesthetic. Type: Regional.    No history of anesthetic complications       ROS/MED HX  ENT/Pulmonary:  - neg pulmonary ROS     Neurologic:  - neg neurologic ROS     Cardiovascular:  - neg cardiovascular ROS     METS/Exercise Tolerance:     Hematologic:  - neg hematologic  ROS     Musculoskeletal:       GI/Hepatic: Comment: cholecystitis      Renal/Genitourinary:       Endo: Comment: abnl glucose tolerance test    (+) Obesity,     Psychiatric/Substance Use:  - neg psychiatric ROS     Infectious Disease:       Malignancy:       Other:     (-) previous  and TOLAC candidate       Physical Exam    Airway        Mallampati: II   TM distance: > 3 FB   Neck ROM: full   Mouth opening: > 3 cm    Respiratory Devices and Support         Dental  no notable dental history         Cardiovascular   cardiovascular exam normal          Pulmonary   pulmonary exam normal            Other findings: Lab Test        21                       0825          HGB          13.8           No lab results found.            OUTSIDE LABS:  CBC:   Lab Results   Component Value Date    HGB 13.8 2021     BMP: No results found for: NA, POTASSIUM, CHLORIDE, CO2, BUN, CR, GLC  COAGS: No results found for: PTT, INR, FIBR  POC: No results found for: BGM, HCG, HCGS  HEPATIC: No results found for: ALBUMIN, PROTTOTAL, ALT, AST, GGT, ALKPHOS, BILITOTAL,  BILIDIRECT, JESSICA  OTHER: No results found for: PH, LACT, A1C, LUISANA, PHOS, MAG, LIPASE, AMYLASE, TSH, T4, T3, CRP, SED    Anesthesia Plan    ASA Status:  2      Anesthesia Type: Epidural.              Consents    Anesthesia Plan(s) and associated risks, benefits, and realistic alternatives discussed. Questions answered and patient/representative(s) expressed understanding.     - Discussed with:  Patient         Postoperative Care            Comments:           neg OB ROS.       Chilo Ryan MD

## 2021-06-08 NOTE — PLAN OF CARE
VSS, postpartum checks WDL. Pt up ad ethan, voiding without difficulty. Pain well managed with PO ibu and tyl. Independent with self and infant cares. Positive bonding observed with infant, no support person present at this time.

## 2021-06-08 NOTE — PROVIDER NOTIFICATION
06/07/21 2015   Provider Notification   Provider Name/Title Dr. Hughes    Method of Notification Phone   Request Evaluate - Remote   Notification Reason Labor Status     Physician informed of SVE, response to labor, contraction pattern, and fetal heart rate. Plan to attempt different positions, increase pitocin as appropriate, and recheck cervix in 2 - 3 hrs unless indicated earlier. No new orders received. Will inform pt of plan of care. Elizabeth Salinas RN on 6/8/2021 at 7:02 AM

## 2021-06-08 NOTE — L&D DELIVERY NOTE
OB Vaginal Delivery Note      HPI:  Pt is a 31 year old  @ 39w1d who presented to L&D on 2021 for IOL for symptomatic cholelithiasis.      Prenatal Course:  Regular care prenatal care    Prenatal labs:  A, rosario negative, RI,  Hep B/HIV/RPR all negative, GC/CT negative, , GBS negative    Pregnancy complications:    - Symptomatic cholelithiasis  - Failed 1 hr GCT, passed 3 hr GCT    She declined Tdap and influenza vaccinations this pregnancy.    OB History:   Hx  x1    Hospital Course:    First Stage:  Patient was admitted to L&D on IOL for symptomatic cholelithiasis.  FHTs were reassuring. Abdomen was non-tender.  EFW was 7.5lb.  She initially declined AROM and preferred pitocin. She had tachysystole with pitocin so this was discontinued and AROM performed at 1300 with clear fluid noted. Pitocin was restarted at 1415 and titrated to a maximum of 7mu. She received an epidural for analgesia.  Patient reached complete cervical dilation at approximately 0200 on 2021.    Second Stage:  Patient was allowed to begin pushing after reaching complete cervical dilation.  Good maternal expulsive efforts were noted.  Fetal heart tones remained reassuring during the second stage. Baseline 130, with moderate variability, variable decelerations noted.  She was able to bring the fetal vertex to a full crown.  The fetal vertex was then easily delivered, followed by the fetal shoulders without complications.  A nuchal cord was noted and was unable to be reduced after delivery of the fetal vertex.  The remainder of the infant was then delivered. After 60 seconds the cord was clamped and cut and the infant was placed on the maternal abdomen.  The infants weight was pending. The infant's heart rate was noted to be in 70's and the infant was brought to the warmer for resuscitation and the NICU team was called.  The infant improved with stimulation, CPAP, and suctioning. Apgars were 6 and 9 at one and five minutes.   Cord gases were not sent.      Third Stage:  The placenta then delivered shortly thereafter.  It was noted to be in tact with a three vessel cord.  The patient's perineum was inspected and a second degree laceration was noted.  This was repaired in the usual fashion using 3-0 vicryl suture.  QBL for the procedure was 100 ml.      The patient tolerated the delivery well.  Sponge and needle counts were correct.  The patient and infant remained in the delivery suite following delivery in stable condition.    Daysi Garza MD  6/8/2021  3:20 AM

## 2021-06-09 VITALS
WEIGHT: 155 LBS | SYSTOLIC BLOOD PRESSURE: 117 MMHG | HEART RATE: 69 BPM | DIASTOLIC BLOOD PRESSURE: 72 MMHG | OXYGEN SATURATION: 100 % | HEIGHT: 60 IN | RESPIRATION RATE: 16 BRPM | BODY MASS INDEX: 30.43 KG/M2 | TEMPERATURE: 98.2 F

## 2021-06-09 LAB — HGB BLD-MCNC: 12.7 G/DL (ref 11.7–15.7)

## 2021-06-09 PROCEDURE — 85018 HEMOGLOBIN: CPT | Performed by: OBSTETRICS & GYNECOLOGY

## 2021-06-09 PROCEDURE — 36415 COLL VENOUS BLD VENIPUNCTURE: CPT | Performed by: OBSTETRICS & GYNECOLOGY

## 2021-06-09 PROCEDURE — 250N000013 HC RX MED GY IP 250 OP 250 PS 637: Performed by: OBSTETRICS & GYNECOLOGY

## 2021-06-09 RX ORDER — ACETAMINOPHEN 325 MG/1
650 TABLET ORAL EVERY 4 HOURS PRN
Qty: 40 TABLET | Refills: 0 | Status: ON HOLD | OUTPATIENT
Start: 2021-06-09 | End: 2023-11-29

## 2021-06-09 RX ORDER — IBUPROFEN 600 MG/1
600 TABLET, FILM COATED ORAL EVERY 6 HOURS PRN
Qty: 40 TABLET | Refills: 0 | Status: ON HOLD | OUTPATIENT
Start: 2021-06-09 | End: 2023-11-29

## 2021-06-09 RX ADMIN — ACETAMINOPHEN 650 MG: 325 TABLET, FILM COATED ORAL at 02:42

## 2021-06-09 RX ADMIN — IBUPROFEN 800 MG: 800 TABLET, FILM COATED ORAL at 02:42

## 2021-06-09 RX ADMIN — DOCUSATE SODIUM 50 MG AND SENNOSIDES 8.6 MG 1 TABLET: 8.6; 5 TABLET, FILM COATED ORAL at 08:19

## 2021-06-09 RX ADMIN — IBUPROFEN 800 MG: 800 TABLET, FILM COATED ORAL at 08:19

## 2021-06-09 RX ADMIN — ACETAMINOPHEN 650 MG: 325 TABLET, FILM COATED ORAL at 08:19

## 2021-06-09 NOTE — PROGRESS NOTES
OB Post-partum Note  PPD#1    S:  Patient doing well.  Pain is well controlled.  Voiding and ambulating without issue.  Bleeding is normal.  Breast and bottle feeding.    O:   Vitals:    21 0518 21 0800 216 21 0242   BP: 106/60 106/69 114/70 116/69   Pulse:  72 77 69   Resp:  16 18 17   Temp:  98  F (36.7  C) 98  F (36.7  C) 98.1  F (36.7  C)   TempSrc:  Axillary Oral Oral   SpO2:   100%    Weight:       Height:         Gen- A&O, NAD  Abd- Non-tender, fundus non tender and firm   Ext- non-tender, no calf pain    Hemoglobin   Date Value Ref Range Status   2021 12.7 11.7 - 15.7 g/dL Final     A+  Rubella Immune  covid neg    A/P: 31 year old  PPD#1 s/p , IOL due to cholelithiasis.    - Routine post-partum cares  - Analgesia tylenol and ibuprofen  - Rh pos, rubella immune  - Breast and bottle feeding.  - Patient declined Tdap and flu shot this pregnancy    Dispo: Would like to discharge home today.  Reviewed discharge instructions. All questions answered.      Cherelle Huitron MD  2021

## 2021-06-09 NOTE — PLAN OF CARE
Data: Vital signs within normal limits. Postpartum checks within normal limits - see flow record. Patient eating and drinking normally. Patient able to empty bladder independently and is up ambulating independently. No apparent signs of infection. Patient performing self cares and is able to care for infant. Breastfeeding baby good and is supplementing with formula.   Action: Patient medicated during the shift for cramping, t-pump heat applied. See MAR. Pt feels better with heat applied.  Response: Positive attachment behaviors observed with infant. Cousin present at bedside.

## 2021-06-09 NOTE — PLAN OF CARE
Patient VS are stable and WNL. Patient is ambulating and voiding independently. Patient to discharge to home per MD. Patient to discharge to home per MD. Patient discharged to home at 1435 with .

## 2021-06-29 ENCOUNTER — TRANSFERRED RECORDS (OUTPATIENT)
Dept: PHYSICAL THERAPY | Facility: CLINIC | Age: 31
End: 2021-06-29

## 2021-08-04 ENCOUNTER — THERAPY VISIT (OUTPATIENT)
Dept: OCCUPATIONAL THERAPY | Facility: CLINIC | Age: 31
End: 2021-08-04
Payer: COMMERCIAL

## 2021-08-04 DIAGNOSIS — G56.00 CARPAL TUNNEL SYNDROME DURING PREGNANCY: ICD-10-CM

## 2021-08-04 DIAGNOSIS — O26.899 CARPAL TUNNEL SYNDROME DURING PREGNANCY: ICD-10-CM

## 2021-08-04 DIAGNOSIS — M25.532 LEFT WRIST PAIN: ICD-10-CM

## 2021-08-04 PROCEDURE — 97760 ORTHOTIC MGMT&TRAING 1ST ENC: CPT | Mod: GO | Performed by: OCCUPATIONAL THERAPIST

## 2021-08-04 PROCEDURE — 97112 NEUROMUSCULAR REEDUCATION: CPT | Mod: GO | Performed by: OCCUPATIONAL THERAPIST

## 2021-08-04 PROCEDURE — 97110 THERAPEUTIC EXERCISES: CPT | Mod: GO | Performed by: OCCUPATIONAL THERAPIST

## 2021-08-04 PROCEDURE — 97165 OT EVAL LOW COMPLEX 30 MIN: CPT | Mod: GO | Performed by: OCCUPATIONAL THERAPIST

## 2021-08-04 NOTE — PROGRESS NOTES
Hand Therapy Initial Evaluation    Current Date:  8/4/2021    Diagnosis: CTS  DOI: 7/19/21 md order     Subjective:  Jaxon Dempsey is a 31 year old female.    Patient reports symptoms of the left wrist hand which occurred due to pregnancy. Started when she was 7 mo pregnant. Since onset symptoms are Unchanged  General health as reported by patient is good.  Patient has since had her child yet sxs persist.     Pertinent medical history includes:   Past Medical History:   Diagnosis Date     Gall stones 2021     Medical allergies:  No Known Allergies    Surgical history: No past surgical history on file.    Medication history:   Current Outpatient Medications   Medication     acetaminophen (TYLENOL) 325 MG tablet     ibuprofen (ADVIL/MOTRIN) 600 MG tablet     polyethylene glycol (MIRALAX) 17 g packet     Prenatal Vit-Fe Fumarate-FA (PRENATAL MULTIVITAMIN W/IRON) 27-0.8 MG tablet     No current facility-administered medications for this visit.     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 [employer]    Job Tasks: Repetitive Tasks, grocery     Occupational Profile Information:  Right hand dominant  Prior functional level:  no limitations  Patient reports symptoms of pain and numbness  Previous treatment: gloves, ice ibuprofen   Barriers include:none  Mobility: No difficulty  Transportation: drives  Leisure activities/hobbies:    Other: worse at night, difficulty with holding baby    Objective:  Pain Level (Scale 0-10):   8/4/2021   At Rest 3-5   With Use 7-10     Pain Description:  Date 8/4/2021   Location wrist, hand and long finger   Pain Quality Aching, Burning and Stabbing   Frequency Constant, intensity fluctuates   Pain is worst  nighttime and morning    Exacerbated by  unknown    Relieved by Nothing    Progression Unchanged      Posture  Forward Neck Posture and Rounded Forward Shoulders    Edema  Mild     Sensation  Glove Like per pt report    ROM  Pain Report: - none  +  mild    ++ moderate    +++ severe   Wrist 8/4/2021 8/4/2021   AROM (PROM) R L   Extension 70 70 ++   Flexion 75 60   RD 10 5   UD 25 25     Special Tests  Pain Report:  - none    + mild    ++ moderate    +++ severe    8/4/2021   Median Nerve Compression at Pronator ++   Carpal Compression Test--Durkan Test (30 sec) -   Sahni Test for Lumbrical Incursion  (fist x 30 secs) ++   Tinels at Carpal Tunnel ++   Phalens +     Strength   (Measured in pounds)  Pain Report: - none  + mild    ++ moderate    +++ severe    8/4/2021 8/4/2021   Trials L R   1  2  3 25 5 ++   Average 25 5     Lat Pinch 8/4/2021 8/4/2021   Trials L R   1  2  3 6 6   Average 6 6     3 Pt Pinch 8/4/2021 8/4/2021   Trials L R   1  2  3 5 3   Average 5 3     Assessment:  Patient presents with symptoms consistent with diagnosis of left wrist Carpal Tunnel Syndrome, with conservative intervention.     Patient's limitations or Problem List includes:  Pain, Decreased ROM/motion, Increased edema and Sensory disturbance of the left wrist, hand and long finger which interferes with the patient's ability to perform Self Care Tasks (bathing), Work Tasks, Sleep Patterns, Recreational Activities, Household Chores and Driving  as compared to previous level of function.    Rehab Potential:  Excellent - Return to full activity, no limitations    Patient will benefit from skilled Occupational Therapy to increase ROM, overall strength,  strength, pinch strength and sensation and decrease pain and edema to return to previous activity level and resume normal daily tasks and to reach their rehab potential.    Barriers to Learning:  Language    Communication Issues:  Patient appears to be able to clearly communicate and understand verbal and written communication and follow directions correctly.    Chart Review: Chart Review and Brief history including review of medical and/or therapy records relating to the presenting problem    Identified Performance Deficits:  care of others, child rearing, health management and maintenance, home establishment and management, meal preparation and cleanup and sleep    Assessment of Occupational Performance:  5 or more Performance Deficits    Clinical Decision Making (Complexity): Low complexity    Treatment Explanation:  The following has been discussed with the patient:  RX ordered/plan of care  Anticipated outcomes  Possible risks and side effects    Plan:  Frequency:  1 X week, once daily  Duration:  for 8 weeks    Treatment Plan:    Modalities:    US and Paraffin   Therapeutic Exercise:    AROM, AAROM, PROM, Tendon Gliding, Blocking, Reverse Blocking, Place and Hold, Contract Relax, Isotonics, Isometrics and Stabilization  Neuromuscular re-ed:   Nerve Gliding, Coordination/Dexterity, Sensory re-education, Desensitization, Kinesthetic Training, Proprioceptive Training, Posture, Kinesiotaping and Strain Counter Strain  Manual Techniques:   Myofascial release and Manual edema mobilization  Orthotic Fabrication:    Static and Forearm based  Self Care:    Self Care Tasks, Ergonomic Considerations, Community Transportation and Work Tasks    Discharge Plan:  Achieve all LTG.  Independent in home treatment program.  Reach maximal therapeutic benefit.    Home Exercise Program:  Tendon gliding   Nerve gliding   Night splinting   Edema mgmt     Next Visit:  Nerve gliding

## 2021-08-05 PROBLEM — M25.532 LEFT WRIST PAIN: Status: ACTIVE | Noted: 2021-08-05

## 2021-08-05 PROBLEM — O26.899 CARPAL TUNNEL SYNDROME DURING PREGNANCY: Status: ACTIVE | Noted: 2021-08-05

## 2021-08-05 PROBLEM — G56.00 CARPAL TUNNEL SYNDROME DURING PREGNANCY: Status: ACTIVE | Noted: 2021-08-05

## 2021-09-07 PROBLEM — O26.899 CARPAL TUNNEL SYNDROME DURING PREGNANCY: Status: RESOLVED | Noted: 2021-08-05 | Resolved: 2021-09-07

## 2021-09-07 PROBLEM — M25.532 LEFT WRIST PAIN: Status: RESOLVED | Noted: 2021-08-05 | Resolved: 2021-09-07

## 2021-09-07 PROBLEM — G56.00 CARPAL TUNNEL SYNDROME DURING PREGNANCY: Status: RESOLVED | Noted: 2021-08-05 | Resolved: 2021-09-07

## 2023-01-20 ENCOUNTER — LAB REQUISITION (OUTPATIENT)
Dept: LAB | Facility: CLINIC | Age: 33
End: 2023-01-20
Payer: COMMERCIAL

## 2023-01-20 ENCOUNTER — LAB REQUISITION (OUTPATIENT)
Dept: LAB | Facility: CLINIC | Age: 33
End: 2023-01-20

## 2023-01-20 DIAGNOSIS — Z01.419 ENCOUNTER FOR GYNECOLOGICAL EXAMINATION (GENERAL) (ROUTINE) WITHOUT ABNORMAL FINDINGS: ICD-10-CM

## 2023-01-20 DIAGNOSIS — R63.4 ABNORMAL WEIGHT LOSS: ICD-10-CM

## 2023-01-20 LAB
ALBUMIN SERPL BCG-MCNC: 4.4 G/DL (ref 3.5–5.2)
ALP SERPL-CCNC: 64 U/L (ref 35–104)
ALT SERPL W P-5'-P-CCNC: 7 U/L (ref 10–35)
ANION GAP SERPL CALCULATED.3IONS-SCNC: 13 MMOL/L (ref 7–15)
AST SERPL W P-5'-P-CCNC: 20 U/L (ref 10–35)
BASOPHILS # BLD AUTO: 0.1 10E3/UL (ref 0–0.2)
BASOPHILS NFR BLD AUTO: 1 %
BILIRUB SERPL-MCNC: 0.9 MG/DL
BUN SERPL-MCNC: 8.5 MG/DL (ref 6–20)
CALCIUM SERPL-MCNC: 9.2 MG/DL (ref 8.6–10)
CHLORIDE SERPL-SCNC: 102 MMOL/L (ref 98–107)
CREAT SERPL-MCNC: 0.49 MG/DL (ref 0.51–0.95)
DEPRECATED HCO3 PLAS-SCNC: 22 MMOL/L (ref 22–29)
EOSINOPHIL # BLD AUTO: 0.1 10E3/UL (ref 0–0.7)
EOSINOPHIL NFR BLD AUTO: 2 %
ERYTHROCYTE [DISTWIDTH] IN BLOOD BY AUTOMATED COUNT: 12.3 % (ref 10–15)
GFR SERPL CREATININE-BSD FRML MDRD: >90 ML/MIN/1.73M2
GLUCOSE SERPL-MCNC: 83 MG/DL (ref 70–99)
HCT VFR BLD AUTO: 41.7 % (ref 35–47)
HGB BLD-MCNC: 13.8 G/DL (ref 11.7–15.7)
HOLD SPECIMEN: NORMAL
IMM GRANULOCYTES # BLD: 0 10E3/UL
IMM GRANULOCYTES NFR BLD: 0 %
LYMPHOCYTES # BLD AUTO: 1.9 10E3/UL (ref 0.8–5.3)
LYMPHOCYTES NFR BLD AUTO: 33 %
MCH RBC QN AUTO: 30.6 PG (ref 26.5–33)
MCHC RBC AUTO-ENTMCNC: 33.1 G/DL (ref 31.5–36.5)
MCV RBC AUTO: 93 FL (ref 78–100)
MONOCYTES # BLD AUTO: 0.5 10E3/UL (ref 0–1.3)
MONOCYTES NFR BLD AUTO: 8 %
NEUTROPHILS # BLD AUTO: 3.3 10E3/UL (ref 1.6–8.3)
NEUTROPHILS NFR BLD AUTO: 56 %
NRBC # BLD AUTO: 0 10E3/UL
NRBC BLD AUTO-RTO: 0 /100
PLATELET # BLD AUTO: 267 10E3/UL (ref 150–450)
POTASSIUM SERPL-SCNC: 3.4 MMOL/L (ref 3.4–5.3)
PROT SERPL-MCNC: 7.3 G/DL (ref 6.4–8.3)
RBC # BLD AUTO: 4.51 10E6/UL (ref 3.8–5.2)
SODIUM SERPL-SCNC: 137 MMOL/L (ref 136–145)
TSH SERPL DL<=0.005 MIU/L-ACNC: 1.33 UIU/ML (ref 0.3–4.2)
VIT B12 SERPL-MCNC: 875 PG/ML (ref 232–1245)
WBC # BLD AUTO: 5.9 10E3/UL (ref 4–11)

## 2023-01-20 PROCEDURE — 82652 VIT D 1 25-DIHYDROXY: CPT | Performed by: MIDWIFE

## 2023-01-20 PROCEDURE — 82607 VITAMIN B-12: CPT | Performed by: MIDWIFE

## 2023-01-20 PROCEDURE — 85025 COMPLETE CBC W/AUTO DIFF WBC: CPT | Mod: ORL | Performed by: MIDWIFE

## 2023-01-20 PROCEDURE — 80053 COMPREHEN METABOLIC PANEL: CPT | Mod: ORL | Performed by: MIDWIFE

## 2023-01-20 PROCEDURE — 87624 HPV HI-RISK TYP POOLED RSLT: CPT | Mod: ORL | Performed by: MIDWIFE

## 2023-01-20 PROCEDURE — 84443 ASSAY THYROID STIM HORMONE: CPT | Mod: ORL | Performed by: MIDWIFE

## 2023-01-20 PROCEDURE — G0145 SCR C/V CYTO,THINLAYER,RESCR: HCPCS | Mod: ORL | Performed by: MIDWIFE

## 2023-01-25 LAB
1,25(OH)2D SERPL-MCNC: 46.2 PG/ML (ref 19.9–79.3)
BKR LAB AP GYN ADEQUACY: NORMAL
BKR LAB AP GYN INTERPRETATION: NORMAL
BKR LAB AP HPV REFLEX: NORMAL
BKR LAB AP LMP: NORMAL
BKR LAB AP PREVIOUS ABNL DX: NORMAL
BKR LAB AP PREVIOUS ABNORMAL: NORMAL
PATH REPORT.COMMENTS IMP SPEC: NORMAL
PATH REPORT.COMMENTS IMP SPEC: NORMAL
PATH REPORT.RELEVANT HX SPEC: NORMAL

## 2023-01-27 LAB
HUMAN PAPILLOMA VIRUS 16 DNA: NEGATIVE
HUMAN PAPILLOMA VIRUS 18 DNA: NEGATIVE
HUMAN PAPILLOMA VIRUS FINAL DIAGNOSIS: ABNORMAL
HUMAN PAPILLOMA VIRUS OTHER HR: POSITIVE

## 2023-05-05 ENCOUNTER — LAB REQUISITION (OUTPATIENT)
Dept: LAB | Facility: CLINIC | Age: 33
End: 2023-05-05

## 2023-05-05 DIAGNOSIS — Z34.81 ENCOUNTER FOR SUPERVISION OF OTHER NORMAL PREGNANCY, FIRST TRIMESTER: ICD-10-CM

## 2023-05-05 LAB
BASOPHILS # BLD AUTO: 0.1 10E3/UL (ref 0–0.2)
BASOPHILS NFR BLD AUTO: 1 %
EOSINOPHIL # BLD AUTO: 0.2 10E3/UL (ref 0–0.7)
EOSINOPHIL NFR BLD AUTO: 2 %
ERYTHROCYTE [DISTWIDTH] IN BLOOD BY AUTOMATED COUNT: 13.4 % (ref 10–15)
HCT VFR BLD AUTO: 39.5 % (ref 35–47)
HGB BLD-MCNC: 13.2 G/DL (ref 11.7–15.7)
IMM GRANULOCYTES # BLD: 0 10E3/UL
IMM GRANULOCYTES NFR BLD: 0 %
LYMPHOCYTES # BLD AUTO: 1.8 10E3/UL (ref 0.8–5.3)
LYMPHOCYTES NFR BLD AUTO: 22 %
MCH RBC QN AUTO: 31.7 PG (ref 26.5–33)
MCHC RBC AUTO-ENTMCNC: 33.4 G/DL (ref 31.5–36.5)
MCV RBC AUTO: 95 FL (ref 78–100)
MONOCYTES # BLD AUTO: 0.6 10E3/UL (ref 0–1.3)
MONOCYTES NFR BLD AUTO: 7 %
NEUTROPHILS # BLD AUTO: 5.6 10E3/UL (ref 1.6–8.3)
NEUTROPHILS NFR BLD AUTO: 68 %
NRBC # BLD AUTO: 0 10E3/UL
NRBC BLD AUTO-RTO: 0 /100
PLATELET # BLD AUTO: 229 10E3/UL (ref 150–450)
RBC # BLD AUTO: 4.17 10E6/UL (ref 3.8–5.2)
WBC # BLD AUTO: 8.4 10E3/UL (ref 4–11)

## 2023-05-05 PROCEDURE — 85025 COMPLETE CBC W/AUTO DIFF WBC: CPT | Performed by: MIDWIFE

## 2023-05-05 PROCEDURE — 87086 URINE CULTURE/COLONY COUNT: CPT | Performed by: MIDWIFE

## 2023-05-05 PROCEDURE — 87491 CHLMYD TRACH DNA AMP PROBE: CPT | Performed by: MIDWIFE

## 2023-05-06 LAB
C TRACH DNA SPEC QL PROBE+SIG AMP: NEGATIVE
N GONORRHOEA DNA SPEC QL NAA+PROBE: NEGATIVE

## 2023-05-07 LAB — BACTERIA UR CULT: NO GROWTH

## 2023-06-02 ENCOUNTER — TRANSFERRED RECORDS (OUTPATIENT)
Dept: HEALTH INFORMATION MANAGEMENT | Facility: CLINIC | Age: 33
End: 2023-06-02
Payer: COMMERCIAL

## 2023-06-30 ENCOUNTER — APPOINTMENT (OUTPATIENT)
Dept: ULTRASOUND IMAGING | Facility: CLINIC | Age: 33
End: 2023-06-30
Attending: EMERGENCY MEDICINE
Payer: COMMERCIAL

## 2023-06-30 ENCOUNTER — HOSPITAL ENCOUNTER (EMERGENCY)
Facility: CLINIC | Age: 33
Discharge: HOME | End: 2023-06-30
Attending: EMERGENCY MEDICINE | Admitting: EMERGENCY MEDICINE
Payer: COMMERCIAL

## 2023-06-30 VITALS
HEART RATE: 80 BPM | RESPIRATION RATE: 18 BRPM | DIASTOLIC BLOOD PRESSURE: 65 MMHG | TEMPERATURE: 97.5 F | OXYGEN SATURATION: 100 % | SYSTOLIC BLOOD PRESSURE: 93 MMHG

## 2023-06-30 DIAGNOSIS — R10.13 ABDOMINAL PAIN, EPIGASTRIC: ICD-10-CM

## 2023-06-30 DIAGNOSIS — E87.6 HYPOKALEMIA: ICD-10-CM

## 2023-06-30 DIAGNOSIS — K80.80 BILIARY CALCULUS OF OTHER SITE WITHOUT OBSTRUCTION: ICD-10-CM

## 2023-06-30 DIAGNOSIS — O21.9 NAUSEA AND VOMITING DURING PREGNANCY: ICD-10-CM

## 2023-06-30 LAB
ALBUMIN SERPL BCG-MCNC: 4 G/DL (ref 3.5–5.2)
ALP SERPL-CCNC: 56 U/L (ref 35–104)
ALT SERPL W P-5'-P-CCNC: 17 U/L (ref 0–50)
ANION GAP SERPL CALCULATED.3IONS-SCNC: 14 MMOL/L (ref 7–15)
AST SERPL W P-5'-P-CCNC: 22 U/L (ref 0–45)
BASOPHILS # BLD AUTO: 0 10E3/UL (ref 0–0.2)
BASOPHILS NFR BLD AUTO: 0 %
BILIRUB SERPL-MCNC: 1.8 MG/DL
BUN SERPL-MCNC: 6.6 MG/DL (ref 6–20)
CALCIUM SERPL-MCNC: 8.4 MG/DL (ref 8.6–10)
CHLORIDE SERPL-SCNC: 103 MMOL/L (ref 98–107)
CREAT SERPL-MCNC: 0.4 MG/DL (ref 0.51–0.95)
DEPRECATED HCO3 PLAS-SCNC: 19 MMOL/L (ref 22–29)
EOSINOPHIL # BLD AUTO: 0.1 10E3/UL (ref 0–0.7)
EOSINOPHIL NFR BLD AUTO: 1 %
ERYTHROCYTE [DISTWIDTH] IN BLOOD BY AUTOMATED COUNT: 13 % (ref 10–15)
FLUAV RNA SPEC QL NAA+PROBE: NEGATIVE
FLUBV RNA RESP QL NAA+PROBE: NEGATIVE
GFR SERPL CREATININE-BSD FRML MDRD: >90 ML/MIN/1.73M2
GLUCOSE SERPL-MCNC: 73 MG/DL (ref 70–99)
HCT VFR BLD AUTO: 38.2 % (ref 35–47)
HGB BLD-MCNC: 13.5 G/DL (ref 11.7–15.7)
IMM GRANULOCYTES # BLD: 0 10E3/UL
IMM GRANULOCYTES NFR BLD: 0 %
LIPASE SERPL-CCNC: 14 U/L (ref 13–60)
LYMPHOCYTES # BLD AUTO: 1.1 10E3/UL (ref 0.8–5.3)
LYMPHOCYTES NFR BLD AUTO: 12 %
MCH RBC QN AUTO: 32.9 PG (ref 26.5–33)
MCHC RBC AUTO-ENTMCNC: 35.3 G/DL (ref 31.5–36.5)
MCV RBC AUTO: 93 FL (ref 78–100)
MONOCYTES # BLD AUTO: 0.3 10E3/UL (ref 0–1.3)
MONOCYTES NFR BLD AUTO: 4 %
NEUTROPHILS # BLD AUTO: 7.8 10E3/UL (ref 1.6–8.3)
NEUTROPHILS NFR BLD AUTO: 83 %
NRBC # BLD AUTO: 0 10E3/UL
NRBC BLD AUTO-RTO: 0 /100
PLATELET # BLD AUTO: 155 10E3/UL (ref 150–450)
POTASSIUM SERPL-SCNC: 3.2 MMOL/L (ref 3.4–5.3)
PROT SERPL-MCNC: 6.6 G/DL (ref 6.4–8.3)
RBC # BLD AUTO: 4.1 10E6/UL (ref 3.8–5.2)
RSV RNA SPEC NAA+PROBE: NEGATIVE
SARS-COV-2 RNA RESP QL NAA+PROBE: NEGATIVE
SODIUM SERPL-SCNC: 136 MMOL/L (ref 136–145)
WBC # BLD AUTO: 9.5 10E3/UL (ref 4–11)

## 2023-06-30 PROCEDURE — 250N000011 HC RX IP 250 OP 636: Mod: JZ | Performed by: EMERGENCY MEDICINE

## 2023-06-30 PROCEDURE — 96361 HYDRATE IV INFUSION ADD-ON: CPT

## 2023-06-30 PROCEDURE — 250N000013 HC RX MED GY IP 250 OP 250 PS 637: Performed by: EMERGENCY MEDICINE

## 2023-06-30 PROCEDURE — 85025 COMPLETE CBC W/AUTO DIFF WBC: CPT | Performed by: EMERGENCY MEDICINE

## 2023-06-30 PROCEDURE — 76705 ECHO EXAM OF ABDOMEN: CPT

## 2023-06-30 PROCEDURE — 80053 COMPREHEN METABOLIC PANEL: CPT | Performed by: EMERGENCY MEDICINE

## 2023-06-30 PROCEDURE — 258N000003 HC RX IP 258 OP 636: Performed by: EMERGENCY MEDICINE

## 2023-06-30 PROCEDURE — 83690 ASSAY OF LIPASE: CPT | Performed by: EMERGENCY MEDICINE

## 2023-06-30 PROCEDURE — 96375 TX/PRO/DX INJ NEW DRUG ADDON: CPT

## 2023-06-30 PROCEDURE — 36415 COLL VENOUS BLD VENIPUNCTURE: CPT | Performed by: EMERGENCY MEDICINE

## 2023-06-30 PROCEDURE — 96374 THER/PROPH/DIAG INJ IV PUSH: CPT

## 2023-06-30 PROCEDURE — 76801 OB US < 14 WKS SINGLE FETUS: CPT

## 2023-06-30 PROCEDURE — 99285 EMERGENCY DEPT VISIT HI MDM: CPT | Mod: 25

## 2023-06-30 PROCEDURE — 87637 SARSCOV2&INF A&B&RSV AMP PRB: CPT | Performed by: EMERGENCY MEDICINE

## 2023-06-30 RX ORDER — DIPHENHYDRAMINE HYDROCHLORIDE 50 MG/ML
25 INJECTION INTRAMUSCULAR; INTRAVENOUS ONCE
Status: COMPLETED | OUTPATIENT
Start: 2023-06-30 | End: 2023-06-30

## 2023-06-30 RX ORDER — FAMOTIDINE 20 MG/1
20 TABLET, FILM COATED ORAL 2 TIMES DAILY
Qty: 20 TABLET | Refills: 0 | Status: SHIPPED | OUTPATIENT
Start: 2023-06-30 | End: 2023-06-30

## 2023-06-30 RX ORDER — ONDANSETRON 4 MG/1
4 TABLET, ORALLY DISINTEGRATING ORAL EVERY 8 HOURS PRN
Qty: 10 TABLET | Refills: 0 | Status: SHIPPED | OUTPATIENT
Start: 2023-06-30 | End: 2023-06-30

## 2023-06-30 RX ORDER — METOCLOPRAMIDE HYDROCHLORIDE 5 MG/ML
5 INJECTION INTRAMUSCULAR; INTRAVENOUS ONCE
Status: COMPLETED | OUTPATIENT
Start: 2023-06-30 | End: 2023-06-30

## 2023-06-30 RX ORDER — FAMOTIDINE 20 MG/1
20 TABLET, FILM COATED ORAL 2 TIMES DAILY
Qty: 20 TABLET | Refills: 0 | Status: SHIPPED | OUTPATIENT
Start: 2023-06-30

## 2023-06-30 RX ORDER — POTASSIUM CHLORIDE 1.5 G/1.58G
40 POWDER, FOR SOLUTION ORAL ONCE
Status: COMPLETED | OUTPATIENT
Start: 2023-06-30 | End: 2023-06-30

## 2023-06-30 RX ORDER — ONDANSETRON 4 MG/1
4 TABLET, ORALLY DISINTEGRATING ORAL EVERY 8 HOURS PRN
Qty: 10 TABLET | Refills: 0 | Status: SHIPPED | OUTPATIENT
Start: 2023-06-30

## 2023-06-30 RX ADMIN — SODIUM CHLORIDE, POTASSIUM CHLORIDE, SODIUM LACTATE AND CALCIUM CHLORIDE 1000 ML: 600; 310; 30; 20 INJECTION, SOLUTION INTRAVENOUS at 20:05

## 2023-06-30 RX ADMIN — METOCLOPRAMIDE HYDROCHLORIDE 5 MG: 5 INJECTION INTRAMUSCULAR; INTRAVENOUS at 20:09

## 2023-06-30 RX ADMIN — POTASSIUM CHLORIDE 40 MEQ: 1.5 POWDER, FOR SOLUTION ORAL at 22:33

## 2023-06-30 RX ADMIN — DIPHENHYDRAMINE HYDROCHLORIDE 25 MG: 50 INJECTION INTRAMUSCULAR; INTRAVENOUS at 20:08

## 2023-06-30 RX ADMIN — FAMOTIDINE 20 MG: 10 INJECTION, SOLUTION INTRAVENOUS at 22:33

## 2023-06-30 ASSESSMENT — ACTIVITIES OF DAILY LIVING (ADL): ADLS_ACUITY_SCORE: 35

## 2023-06-30 NOTE — ED TRIAGE NOTES
Vomiting since noon yesterday. 17 weeks pregnant. Encouraged to come in by OB for hydration. Tried taking nausea meds at home without relief. Unsure of what the medication was called.

## 2023-07-01 NOTE — ED PROVIDER NOTES
History     Chief Complaint:  Vomiting     The history is provided by the patient.      Jaxon Dempsey is a 33 year old female who presents to the ED for vomiting. Patient reports being 17 weeks pregnant and following up with her Ob/Gyn doctor. She reports nonbloody, nonbilious vomiting and upper abdominal pain and that the pain started yesterday. She denies fever, cough, dysuria, chest pain, and shortness of breath. No vaginal bleeding, diarrhea, dysuria or other symptoms. She reports when drinking water she feels that she may vomit again. No sick contacts, suspicious foods.     Independent Historian:   None - Patient Only    Review of External Notes:   Reviewed 06/22/23 office visit regarding abdominal pain and gestational period.     Medications:    Miralax    Past Medical History:    Gallstones    Physical Exam     Patient Vitals for the past 24 hrs:   BP Temp Temp src Pulse Resp SpO2   06/30/23 2225 -- -- -- -- 18 100 %   06/30/23 1840 93/65 97.5  F (36.4  C) Temporal 80 18 100 %   06/30/23 1839 -- -- -- 82 -- --      Physical Exam  Nursing note and vitals reviewed.  Constitutional: Thin appearing  Eyes: Conjunctiva normal.  Pupils are equal, round, and reactive to light.   ENT: Nose normal. Mucous membranes pink and moist.    Neck: Normal range of motion.  CVS: Normal rate, regular rhythm.  Normal heart sounds.  No murmur.  Pulmonary: Lungs clear to auscultation bilaterally. No wheezes/rales/rhonchi.  GI: Gravid. Abdomen soft. Nontender. No rigidity or guarding. No CVA tenderness    MSK: No calf tenderness or swelling.  Neuro: Alert. Follows simple commands.  Skin: Skin is warm and dry. No rash noted.   Psychiatric: Normal affect.       Emergency Department Course     Imaging:  US OB < 14 Weeks Single   Final Result   IMPRESSION:     1.  Single living intrauterine gestation.   2.  Based on prior dating, composite age of 17 weeks and 5 days with EDC 12/03/2023.   3.  Normal interval growth.   4.  No acute or  suspicious findings. No placenta previa.         US Abdomen Limited (RUQ)   Final Result   IMPRESSION:   1.  Cholelithiasis with a positive sonographic Pascual's sign per the ultrasound technologist. No associated gallbladder wall thickening or edema. These findings are equivocal for acute cholecystitis but in the appropriate clinical setting, this could    reflect a very early/mild acute cholecystitis. No biliary ductal dilatation.   2.  Mildly increased right renal cortical echogenicity which could reflect chronic medical renal disease. Correlation with renal function tests may be helpful. No hydronephrosis.               Report per radiology    Laboratory:  Labs Ordered and Resulted from Time of ED Arrival to Time of ED Departure   COMPREHENSIVE METABOLIC PANEL - Abnormal       Result Value    Sodium 136      Potassium 3.2 (*)     Chloride 103      Carbon Dioxide (CO2) 19 (*)     Anion Gap 14      Urea Nitrogen 6.6      Creatinine 0.40 (*)     Calcium 8.4 (*)     Glucose 73      Alkaline Phosphatase 56      AST 22      ALT 17      Protein Total 6.6      Albumin 4.0      Bilirubin Total 1.8 (*)     GFR Estimate >90     LIPASE - Normal    Lipase 14     CBC WITH PLATELETS AND DIFFERENTIAL    WBC Count 9.5      RBC Count 4.10      Hemoglobin 13.5      Hematocrit 38.2      MCV 93      MCH 32.9      MCHC 35.3      RDW 13.0      Platelet Count 155      % Neutrophils 83      % Lymphocytes 12      % Monocytes 4      % Eosinophils 1      % Basophils 0      % Immature Granulocytes 0      NRBCs per 100 WBC 0      Absolute Neutrophils 7.8      Absolute Lymphocytes 1.1      Absolute Monocytes 0.3      Absolute Eosinophils 0.1      Absolute Basophils 0.0      Absolute Immature Granulocytes 0.0      Absolute NRBCs 0.0        Emergency Department Course & Assessments:     Interventions:  Medications   lactated ringers BOLUS 1,000 mL (0 mLs Intravenous Stopped 6/30/23 2112)   metoclopramide (REGLAN) injection 5 mg (5 mg Intravenous  $Given 6/30/23 2009)   diphenhydrAMINE (BENADRYL) injection 25 mg (25 mg Intravenous $Given 6/30/23 2008)   potassium chloride (KLOR-CON) Packet 40 mEq (40 mEq Oral $Given 6/30/23 2233)   famotidine (PEPCID) injection 20 mg (20 mg Intravenous $Given 6/30/23 2233)        Assessments/Consultations/Discussion of Management or Tests:  ED Course as of 07/01/23 0001 Fri Jun 30, 2023 2222 I obtained history and examined the patient as noted above.    2343 I paged general surgery.    2356 I spoke with Dr. Brown, general surgery, regarding the patient.      Social Determinants of Health affecting care:   None    Disposition:  The patient was discharged to home.     Impression & Plan      Medical Decision Making:    Patient is a 33-year-old female presenting with predominantly vomiting in second trimester pregnancy.  Her work-up was initiated in triage given prolonged wait times.  Labs without profound anemia or significant electrolyte derangement other than mild hypokalemia.  She was tested for COVID-19/influenza in light of her symptoms a result pending at time of dispo.  After IV fluids, Pepcid and antiemetics, she reported significant symptom improvement.  She has no abdominal tenderness on my exam.  She did undergo formal pelvic ultrasound which confirms live IUP.  She denies any dysuria/vaginal bleeding.  She did undergo formal right upper quadrant ultrasound as well which shows cholelithiasis with a positive sonographic Pascual sign for ultrasound tech though no associated gallbladder wall thickening or edema.  These findings could be equivocal to early acute cholecystitis as was discussed with the patient.  LFTs/lipase reassuring.  I did offer patient observation with surgical consultation though she is requesting discharge at this point in time.  Will initiate Augmentin with plans for general surgery consultation on discharge.  I did speak to general surgery who will facilitate close outpatient follow-up.   Patient is aware of need to return to the ED for increasing abdominal pain, fever or should symptoms worsen or change.    Diagnosis:    ICD-10-CM    1. Nausea and vomiting during pregnancy  O21.9       2. Hypokalemia  E87.6       3. Abdominal pain, epigastric  R10.13       4. Biliary calculus of other site without obstruction  K80.80 Adult General Surg Referral         Discharge Medications:  Discharge Medication List as of 6/30/2023 10:39 PM      START taking these medications    Details   famotidine (PEPCID) 20 MG tablet Take 1 tablet (20 mg) by mouth 2 times daily for 10 days, Disp-20 tablet, R-0, Local Print      ondansetron (ZOFRAN ODT) 4 MG ODT tab Take 1 tablet (4 mg) by mouth every 8 hours as needed for nausea, Disp-10 tablet, R-0, Local Print            Scribe Disclosure:  Ruby BLISS, am serving as a scribe at 10:26 PM on 6/30/2023 to document services personally performed by Michelle Jay DO based on my observations and the provider's statements to me.   6/30/2023   Michelle Jay DO McDonald, Lindsey E, DO  07/01/23 0003

## 2023-07-03 ENCOUNTER — APPOINTMENT (OUTPATIENT)
Dept: INTERPRETER SERVICES | Facility: CLINIC | Age: 33
End: 2023-07-03
Payer: COMMERCIAL

## 2023-07-05 ENCOUNTER — OFFICE VISIT (OUTPATIENT)
Dept: SURGERY | Facility: CLINIC | Age: 33
End: 2023-07-05
Payer: COMMERCIAL

## 2023-07-05 VITALS
DIASTOLIC BLOOD PRESSURE: 60 MMHG | SYSTOLIC BLOOD PRESSURE: 92 MMHG | BODY MASS INDEX: 26.46 KG/M2 | HEART RATE: 68 BPM | HEIGHT: 64 IN | OXYGEN SATURATION: 99 % | WEIGHT: 155 LBS

## 2023-07-05 DIAGNOSIS — K80.20 SYMPTOMATIC CHOLELITHIASIS: Primary | ICD-10-CM

## 2023-07-05 PROCEDURE — 99204 OFFICE O/P NEW MOD 45 MIN: CPT | Performed by: STUDENT IN AN ORGANIZED HEALTH CARE EDUCATION/TRAINING PROGRAM

## 2023-07-05 RX ORDER — ACETAMINOPHEN 325 MG/1
975 TABLET ORAL ONCE
Status: CANCELLED | OUTPATIENT
Start: 2023-07-05 | End: 2023-07-05

## 2023-07-05 RX ORDER — INDOCYANINE GREEN AND WATER 25 MG
2.5 KIT INJECTION ONCE
Status: CANCELLED | OUTPATIENT
Start: 2023-07-05 | End: 2023-07-05

## 2023-07-06 NOTE — PROGRESS NOTES
[facility]  New Patient Office Visit      Jaxon Dempsey is a 33 year old female seen in consultation for Abdominal pain, right upper quadrant at the request of ED physician.       Assessment and Plan:  It is my impression that Ms Dempsey who is at 17 weeks gestation has symptomatic cholelithiasis, possibly choledocholithiasis, and possibly subacute vs chronic cholecystitis.   I have offered her a laparoscopic cholecystectomy with possible intraoperative cholangiogram.      We have discussed the indication, alternatives, risks and expected recovery.  Specifically we have discussed incisions, scarring, postoperative infections, anesthesia, bleeding, open conversion, common bile duct injury, injury to intra-abdominal organs, retained common bile duct stone, bile leak, DVT, PE, hernia, post cholecystectomy diarrhea, postoperative dietary restrictions and physical limitations.  We have discussed the recommended interventions and treatments for these complications.    We also discussed surgery in pregnancy. I explained that multiple studies and current guidelines (SAGES guidelines) recommend surgery in pregnancy for patients with gallstone related problems and that surgery is safe and reduce the risk of developing gallstone related complications later in pregnancy. I explained that there is a risk of miscarriage related to general anesthesia but it is low (< 5%) and the benefits of surgery outweigh the risks of gallstone related events later in pregnancy.     I have recommended surgery. The patient would like some time to think about this which is reasonable. She will call our office if she decides to proceed with surgery to get scheduled.     This encounter was performed with the aid of a over the phone certified lizandro .         Chief complaint:  Abdominal pain, right upper quadrant    HPI:  Jaxon Dempsey is a 33 year old female who presents for evaluation of right upper quadrant abdominal pain.  "She had a severe attack of right upper quadrant abdominal pain a few days ago and presented to the ED. She also had nausea and vomiting. She has never had pain like this before. It does not seem to be correlated with meals. She denies fevers. She feels better since leaving the ED but still is sore in the RUQ. She has never had abdominal surgery before. She is 17 weeks pregnant and the pregnancy has not been complicated.     She was seen recently in the ED and I have reviewed their documentation/notes    Past Medical History:  Past Medical History:   Diagnosis Date     Gall stones 2021       Past Surgical History:  None     Social History:  Social History     Tobacco Use     Smoking status: Never     Smokeless tobacco: Never   Substance Use Topics     Alcohol use: Never     Drug use: Never        Family History:  No significant family history     Review of Systems:  The review of systems is negative other than noted in the HPI and above.    Physical Exam:  Vitals: BP 92/60 (BP Location: Right arm, Patient Position: Chair, Cuff Size: Adult Regular)   Pulse 68   Ht 1.626 m (5' 4\")   Wt 70.3 kg (155 lb)   LMP 02/26/2023   SpO2 99%   BMI 26.61 kg/m    BMI= Body mass index is 26.61 kg/m .  General - Well developed, well nourished female in no apparent distress  Abdomen: soft, not distended, she is mildly tender to palpation in the RUQ but troncoso sign is negative, the abdomen is otherwise not tender   Neurologic: alert, speech is clear, nonfocal  Psychiatric: Mood and affect appropriate    Relevant labs:    WBC -   Lab Results   Component Value Date    WBC 9.5 06/30/2023       HgB -   Lab Results   Component Value Date    HGB 13.5 06/30/2023       Plt-   Lab Results   Component Value Date     06/30/2023       Liver Function Studies -   Recent Labs   Lab Test 06/30/23 2004   PROTTOTAL 6.6   ALBUMIN 4.0   BILITOTAL 1.8*   ALKPHOS 56   AST 22   ALT 17       Lipase-   Lab Results   Component Value Date    LIPASE " 14 06/30/2023           Imaging:  US of gallbladder reviewed by me.    Recent Results (from the past 744 hour(s))   US Abdomen Limited (RUQ)    Narrative    EXAM: US ABDOMEN LIMITED  LOCATION: [facility]  DATE: 6/30/2023    INDICATION: RUQ abdominal pain, history of gallstones  COMPARISON: None.  TECHNIQUE: Limited abdominal ultrasound.    FINDINGS:    GALLBLADDER: 9 mm stone and minimal sludge in the gallbladder lumen. No gallbladder wall thickening or edema. Positive sonographic Pascual's sign per the ultrasound technologist.    BILE DUCTS: No biliary dilatation. The common duct measures 2 mm.    LIVER: Normal parenchyma with smooth contour. No focal mass. Antegrade flow in the normal caliber main portal vein.    RIGHT KIDNEY: No hydronephrosis. Simple 1.3 cm lower pole cortical cyst. No follow-up is indicated. Mildly increased cortical echogenicity.    PANCREAS: The visualized portions are normal.    No ascites.      Impression    IMPRESSION:  1.  Cholelithiasis with a positive sonographic Pascual's sign per the ultrasound technologist. No associated gallbladder wall thickening or edema. These findings are equivocal for acute cholecystitis but in the appropriate clinical setting, this could   reflect a very early/mild acute cholecystitis. No biliary ductal dilatation.  2.  Mildly increased right renal cortical echogenicity which could reflect chronic medical renal disease. Correlation with renal function tests may be helpful. No hydronephrosis.             Chilo Brown MD  [facility] [place]    Please route or send letter to:  Primary Care Provider (PCP)

## 2023-07-19 ENCOUNTER — TRANSFERRED RECORDS (OUTPATIENT)
Dept: HEALTH INFORMATION MANAGEMENT | Facility: CLINIC | Age: 33
End: 2023-07-19
Payer: COMMERCIAL

## 2023-07-20 ENCOUNTER — PRE VISIT (OUTPATIENT)
Dept: MATERNAL FETAL MEDICINE | Facility: CLINIC | Age: 33
End: 2023-07-20
Payer: COMMERCIAL

## 2023-07-20 ENCOUNTER — TRANSCRIBE ORDERS (OUTPATIENT)
Dept: MATERNAL FETAL MEDICINE | Facility: CLINIC | Age: 33
End: 2023-07-20
Payer: COMMERCIAL

## 2023-07-20 DIAGNOSIS — O26.90 PREGNANCY RELATED CONDITION, ANTEPARTUM: Primary | ICD-10-CM

## 2023-07-21 ENCOUNTER — HOSPITAL ENCOUNTER (OUTPATIENT)
Dept: ULTRASOUND IMAGING | Facility: CLINIC | Age: 33
Discharge: HOME | End: 2023-07-21
Payer: COMMERCIAL

## 2023-07-21 ENCOUNTER — OFFICE VISIT (OUTPATIENT)
Dept: MATERNAL FETAL MEDICINE | Facility: CLINIC | Age: 33
End: 2023-07-21
Payer: COMMERCIAL

## 2023-07-21 DIAGNOSIS — O35.9XX0 SUSPECTED FETAL ANOMALY, ANTEPARTUM, SINGLE OR UNSPECIFIED FETUS: Primary | ICD-10-CM

## 2023-07-21 DIAGNOSIS — O26.90 PREGNANCY RELATED CONDITION, ANTEPARTUM: ICD-10-CM

## 2023-07-21 PROCEDURE — 76811 OB US DETAILED SNGL FETUS: CPT

## 2023-07-21 PROCEDURE — 76811 OB US DETAILED SNGL FETUS: CPT | Mod: 26 | Performed by: OBSTETRICS & GYNECOLOGY

## 2023-07-21 NOTE — PROGRESS NOTES
"Please see \"imaging\" tab under \"Chart Review\" for details of today's US at the [facility].    Nilo Valenzuela MD  Maternal-Fetal Medicine    "

## 2023-07-21 NOTE — NURSING NOTE
Patient reports positive fetal movement, no pain, no contractions, leaking of fluid, or bleeding. SBAR given to JIMMIE LOZANO, see their note in Epic.

## 2023-09-12 ENCOUNTER — LAB REQUISITION (OUTPATIENT)
Dept: LAB | Facility: CLINIC | Age: 33
End: 2023-09-12

## 2023-09-12 DIAGNOSIS — Z36.89 ENCOUNTER FOR OTHER SPECIFIED ANTENATAL SCREENING: ICD-10-CM

## 2023-09-12 LAB — HGB BLD-MCNC: 12.8 G/DL (ref 11.7–15.7)

## 2023-09-12 PROCEDURE — 85018 HEMOGLOBIN: CPT | Performed by: NURSE PRACTITIONER

## 2023-09-12 PROCEDURE — 86592 SYPHILIS TEST NON-TREP QUAL: CPT | Performed by: NURSE PRACTITIONER

## 2023-09-13 LAB — RPR SER QL: NONREACTIVE

## 2023-10-20 ENCOUNTER — LAB REQUISITION (OUTPATIENT)
Dept: LAB | Facility: CLINIC | Age: 33
End: 2023-10-20

## 2023-10-20 DIAGNOSIS — K80.20 CALCULUS OF GALLBLADDER WITHOUT CHOLECYSTITIS WITHOUT OBSTRUCTION: ICD-10-CM

## 2023-10-20 LAB
ALP SERPL-CCNC: 179 U/L (ref 35–104)
ALT SERPL W P-5'-P-CCNC: 12 U/L (ref 0–50)
AST SERPL W P-5'-P-CCNC: 26 U/L (ref 0–45)
HOLD SPECIMEN: NORMAL

## 2023-10-20 PROCEDURE — 84075 ASSAY ALKALINE PHOSPHATASE: CPT | Performed by: NURSE PRACTITIONER

## 2023-10-20 PROCEDURE — 84460 ALANINE AMINO (ALT) (SGPT): CPT | Performed by: NURSE PRACTITIONER

## 2023-10-20 PROCEDURE — 84450 TRANSFERASE (AST) (SGOT): CPT | Performed by: NURSE PRACTITIONER

## 2023-11-14 ENCOUNTER — LAB REQUISITION (OUTPATIENT)
Dept: LAB | Facility: CLINIC | Age: 33
End: 2023-11-14
Payer: COMMERCIAL

## 2023-11-14 DIAGNOSIS — Z3A.37 37 WEEKS GESTATION OF PREGNANCY: ICD-10-CM

## 2023-11-14 PROCEDURE — 87653 STREP B DNA AMP PROBE: CPT | Mod: ORL | Performed by: OBSTETRICS & GYNECOLOGY

## 2023-11-16 LAB — GP B STREP DNA SPEC QL NAA+PROBE: NEGATIVE

## 2023-11-26 ENCOUNTER — HOSPITAL ENCOUNTER (INPATIENT)
Facility: CLINIC | Age: 33
LOS: 3 days | Discharge: HOME | End: 2023-11-29
Attending: OBSTETRICS & GYNECOLOGY | Admitting: OBSTETRICS & GYNECOLOGY
Payer: COMMERCIAL

## 2023-11-26 DIAGNOSIS — R05.2 SUBACUTE COUGH: ICD-10-CM

## 2023-11-26 PROBLEM — Z34.90 PREGNANCY: Status: ACTIVE | Noted: 2023-11-26

## 2023-11-26 LAB
ABO/RH(D): NORMAL
ALBUMIN SERPL BCG-MCNC: 3 G/DL (ref 3.5–5.2)
ALP SERPL-CCNC: 380 U/L (ref 40–150)
ALT SERPL W P-5'-P-CCNC: 89 U/L (ref 0–50)
ANION GAP SERPL CALCULATED.3IONS-SCNC: 14 MMOL/L (ref 7–15)
ANTIBODY SCREEN: NEGATIVE
AST SERPL W P-5'-P-CCNC: 80 U/L (ref 0–45)
BILIRUB SERPL-MCNC: 0.6 MG/DL
BUN SERPL-MCNC: 9.7 MG/DL (ref 6–20)
CALCIUM SERPL-MCNC: 8 MG/DL (ref 8.6–10)
CHLORIDE SERPL-SCNC: 101 MMOL/L (ref 98–107)
CREAT SERPL-MCNC: 0.43 MG/DL (ref 0.51–0.95)
DEPRECATED HCO3 PLAS-SCNC: 18 MMOL/L (ref 22–29)
EGFRCR SERPLBLD CKD-EPI 2021: >90 ML/MIN/1.73M2
ERYTHROCYTE [DISTWIDTH] IN BLOOD BY AUTOMATED COUNT: 13.1 % (ref 10–15)
GLUCOSE SERPL-MCNC: 84 MG/DL (ref 70–99)
HCT VFR BLD AUTO: 36.2 % (ref 35–47)
HGB BLD-MCNC: 12.9 G/DL (ref 11.7–15.7)
MCH RBC QN AUTO: 33.6 PG (ref 26.5–33)
MCHC RBC AUTO-ENTMCNC: 35.6 G/DL (ref 31.5–36.5)
MCV RBC AUTO: 94 FL (ref 78–100)
PLATELET # BLD AUTO: 204 10E3/UL (ref 150–450)
POTASSIUM SERPL-SCNC: 3.6 MMOL/L (ref 3.4–5.3)
PROT SERPL-MCNC: 5.6 G/DL (ref 6.4–8.3)
RBC # BLD AUTO: 3.84 10E6/UL (ref 3.8–5.2)
SODIUM SERPL-SCNC: 133 MMOL/L (ref 135–145)
SPECIMEN EXPIRATION DATE: NORMAL
WBC # BLD AUTO: 11.7 10E3/UL (ref 4–11)

## 2023-11-26 PROCEDURE — 80053 COMPREHEN METABOLIC PANEL: CPT | Performed by: OBSTETRICS & GYNECOLOGY

## 2023-11-26 PROCEDURE — 86780 TREPONEMA PALLIDUM: CPT | Performed by: OBSTETRICS & GYNECOLOGY

## 2023-11-26 PROCEDURE — 120N000001 HC R&B MED SURG/OB

## 2023-11-26 PROCEDURE — 3E0P7VZ INTRODUCTION OF HORMONE INTO FEMALE REPRODUCTIVE, VIA NATURAL OR ARTIFICIAL OPENING: ICD-10-PCS | Performed by: OBSTETRICS & GYNECOLOGY

## 2023-11-26 PROCEDURE — 85027 COMPLETE CBC AUTOMATED: CPT | Performed by: OBSTETRICS & GYNECOLOGY

## 2023-11-26 PROCEDURE — 86901 BLOOD TYPING SEROLOGIC RH(D): CPT | Performed by: OBSTETRICS & GYNECOLOGY

## 2023-11-26 PROCEDURE — 250N000013 HC RX MED GY IP 250 OP 250 PS 637: Performed by: OBSTETRICS & GYNECOLOGY

## 2023-11-26 PROCEDURE — 86850 RBC ANTIBODY SCREEN: CPT | Performed by: OBSTETRICS & GYNECOLOGY

## 2023-11-26 RX ORDER — METHYLERGONOVINE MALEATE 0.2 MG/ML
200 INJECTION INTRAVENOUS
Status: DISCONTINUED | OUTPATIENT
Start: 2023-11-26 | End: 2023-11-28 | Stop reason: HOSPADM

## 2023-11-26 RX ORDER — LIDOCAINE 40 MG/G
CREAM TOPICAL
Status: DISCONTINUED | OUTPATIENT
Start: 2023-11-26 | End: 2023-11-28 | Stop reason: HOSPADM

## 2023-11-26 RX ORDER — MISOPROSTOL 200 UG/1
400 TABLET ORAL
Status: DISCONTINUED | OUTPATIENT
Start: 2023-11-26 | End: 2023-11-28 | Stop reason: HOSPADM

## 2023-11-26 RX ORDER — ONDANSETRON 4 MG/1
4 TABLET, ORALLY DISINTEGRATING ORAL EVERY 6 HOURS PRN
Status: DISCONTINUED | OUTPATIENT
Start: 2023-11-26 | End: 2023-11-28 | Stop reason: HOSPADM

## 2023-11-26 RX ORDER — METOCLOPRAMIDE HYDROCHLORIDE 5 MG/ML
10 INJECTION INTRAMUSCULAR; INTRAVENOUS EVERY 6 HOURS PRN
Status: DISCONTINUED | OUTPATIENT
Start: 2023-11-26 | End: 2023-11-28 | Stop reason: HOSPADM

## 2023-11-26 RX ORDER — PROCHLORPERAZINE MALEATE 10 MG
10 TABLET ORAL EVERY 6 HOURS PRN
Status: DISCONTINUED | OUTPATIENT
Start: 2023-11-26 | End: 2023-11-28 | Stop reason: HOSPADM

## 2023-11-26 RX ORDER — IBUPROFEN 800 MG/1
800 TABLET, FILM COATED ORAL
Status: DISCONTINUED | OUTPATIENT
Start: 2023-11-26 | End: 2023-11-28

## 2023-11-26 RX ORDER — KETOROLAC TROMETHAMINE 30 MG/ML
30 INJECTION, SOLUTION INTRAMUSCULAR; INTRAVENOUS
Status: DISCONTINUED | OUTPATIENT
Start: 2023-11-26 | End: 2023-11-28

## 2023-11-26 RX ORDER — NALOXONE HYDROCHLORIDE 0.4 MG/ML
0.4 INJECTION, SOLUTION INTRAMUSCULAR; INTRAVENOUS; SUBCUTANEOUS
Status: DISCONTINUED | OUTPATIENT
Start: 2023-11-26 | End: 2023-11-28 | Stop reason: HOSPADM

## 2023-11-26 RX ORDER — OXYTOCIN 10 [USP'U]/ML
10 INJECTION, SOLUTION INTRAMUSCULAR; INTRAVENOUS
Status: DISCONTINUED | OUTPATIENT
Start: 2023-11-26 | End: 2023-11-28 | Stop reason: HOSPADM

## 2023-11-26 RX ORDER — ONDANSETRON 2 MG/ML
4 INJECTION INTRAMUSCULAR; INTRAVENOUS EVERY 6 HOURS PRN
Status: DISCONTINUED | OUTPATIENT
Start: 2023-11-26 | End: 2023-11-28 | Stop reason: HOSPADM

## 2023-11-26 RX ORDER — TERBUTALINE SULFATE 1 MG/ML
0.25 INJECTION, SOLUTION SUBCUTANEOUS
Status: DISCONTINUED | OUTPATIENT
Start: 2023-11-26 | End: 2023-11-28 | Stop reason: HOSPADM

## 2023-11-26 RX ORDER — CITRIC ACID/SODIUM CITRATE 334-500MG
30 SOLUTION, ORAL ORAL
Status: DISCONTINUED | OUTPATIENT
Start: 2023-11-26 | End: 2023-11-28 | Stop reason: HOSPADM

## 2023-11-26 RX ORDER — NALOXONE HYDROCHLORIDE 0.4 MG/ML
0.2 INJECTION, SOLUTION INTRAMUSCULAR; INTRAVENOUS; SUBCUTANEOUS
Status: DISCONTINUED | OUTPATIENT
Start: 2023-11-26 | End: 2023-11-28 | Stop reason: HOSPADM

## 2023-11-26 RX ORDER — MISOPROSTOL 200 UG/1
800 TABLET ORAL
Status: DISCONTINUED | OUTPATIENT
Start: 2023-11-26 | End: 2023-11-28 | Stop reason: HOSPADM

## 2023-11-26 RX ORDER — OXYTOCIN/0.9 % SODIUM CHLORIDE 30/500 ML
100-340 PLASTIC BAG, INJECTION (ML) INTRAVENOUS CONTINUOUS PRN
Status: DISCONTINUED | OUTPATIENT
Start: 2023-11-26 | End: 2023-11-28

## 2023-11-26 RX ORDER — METOCLOPRAMIDE 10 MG/1
10 TABLET ORAL EVERY 6 HOURS PRN
Status: DISCONTINUED | OUTPATIENT
Start: 2023-11-26 | End: 2023-11-28 | Stop reason: HOSPADM

## 2023-11-26 RX ORDER — OXYTOCIN/0.9 % SODIUM CHLORIDE 30/500 ML
340 PLASTIC BAG, INJECTION (ML) INTRAVENOUS CONTINUOUS PRN
Status: DISCONTINUED | OUTPATIENT
Start: 2023-11-26 | End: 2023-11-28 | Stop reason: HOSPADM

## 2023-11-26 RX ORDER — HYDROXYZINE HYDROCHLORIDE 50 MG/1
50 TABLET, FILM COATED ORAL
Status: DISCONTINUED | OUTPATIENT
Start: 2023-11-26 | End: 2023-11-28 | Stop reason: HOSPADM

## 2023-11-26 RX ORDER — CARBOPROST TROMETHAMINE 250 UG/ML
250 INJECTION, SOLUTION INTRAMUSCULAR
Status: DISCONTINUED | OUTPATIENT
Start: 2023-11-26 | End: 2023-11-28 | Stop reason: HOSPADM

## 2023-11-26 RX ORDER — PROCHLORPERAZINE 25 MG
25 SUPPOSITORY, RECTAL RECTAL EVERY 12 HOURS PRN
Status: DISCONTINUED | OUTPATIENT
Start: 2023-11-26 | End: 2023-11-28 | Stop reason: HOSPADM

## 2023-11-26 RX ORDER — OXYTOCIN 10 [USP'U]/ML
10 INJECTION, SOLUTION INTRAMUSCULAR; INTRAVENOUS
Status: DISCONTINUED | OUTPATIENT
Start: 2023-11-26 | End: 2023-11-28

## 2023-11-26 RX ORDER — TRANEXAMIC ACID 10 MG/ML
1 INJECTION, SOLUTION INTRAVENOUS EVERY 30 MIN PRN
Status: DISCONTINUED | OUTPATIENT
Start: 2023-11-26 | End: 2023-11-28 | Stop reason: HOSPADM

## 2023-11-26 RX ORDER — FENTANYL CITRATE 50 UG/ML
50 INJECTION, SOLUTION INTRAMUSCULAR; INTRAVENOUS EVERY 30 MIN PRN
Status: DISCONTINUED | OUTPATIENT
Start: 2023-11-26 | End: 2023-11-28 | Stop reason: HOSPADM

## 2023-11-26 RX ADMIN — DINOPROSTONE 10 MG: 10 INSERT VAGINAL at 21:22

## 2023-11-26 ASSESSMENT — ACTIVITIES OF DAILY LIVING (ADL)
ADLS_ACUITY_SCORE: 18
ADLS_ACUITY_SCORE: 18

## 2023-11-27 ENCOUNTER — ANESTHESIA EVENT (OUTPATIENT)
Dept: OBGYN | Facility: CLINIC | Age: 33
End: 2023-11-27
Payer: COMMERCIAL

## 2023-11-27 ENCOUNTER — ANESTHESIA (OUTPATIENT)
Dept: OBGYN | Facility: CLINIC | Age: 33
End: 2023-11-27
Payer: COMMERCIAL

## 2023-11-27 LAB — T PALLIDUM AB SER QL: NONREACTIVE

## 2023-11-27 PROCEDURE — 250N000011 HC RX IP 250 OP 636: Performed by: OBSTETRICS & GYNECOLOGY

## 2023-11-27 PROCEDURE — 250N000009 HC RX 250: Performed by: OBSTETRICS & GYNECOLOGY

## 2023-11-27 PROCEDURE — 250N000013 HC RX MED GY IP 250 OP 250 PS 637: Performed by: OBSTETRICS & GYNECOLOGY

## 2023-11-27 PROCEDURE — 120N000001 HC R&B MED SURG/OB

## 2023-11-27 PROCEDURE — 10907ZC DRAINAGE OF AMNIOTIC FLUID, THERAPEUTIC FROM PRODUCTS OF CONCEPTION, VIA NATURAL OR ARTIFICIAL OPENING: ICD-10-PCS | Performed by: OBSTETRICS & GYNECOLOGY

## 2023-11-27 PROCEDURE — 258N000003 HC RX IP 258 OP 636: Performed by: OBSTETRICS & GYNECOLOGY

## 2023-11-27 PROCEDURE — 370N000003 HC ANESTHESIA WARD SERVICE: Performed by: ANESTHESIOLOGY

## 2023-11-27 PROCEDURE — 3E033VJ INTRODUCTION OF OTHER HORMONE INTO PERIPHERAL VEIN, PERCUTANEOUS APPROACH: ICD-10-PCS | Performed by: OBSTETRICS & GYNECOLOGY

## 2023-11-27 RX ORDER — MISOPROSTOL 100 UG/1
25 TABLET ORAL
Status: DISCONTINUED | OUTPATIENT
Start: 2023-11-27 | End: 2023-11-28 | Stop reason: HOSPADM

## 2023-11-27 RX ORDER — EPHEDRINE SULFATE 50 MG/ML
5 INJECTION, SOLUTION INTRAMUSCULAR; INTRAVENOUS; SUBCUTANEOUS
Status: DISCONTINUED | OUTPATIENT
Start: 2023-11-27 | End: 2023-11-28 | Stop reason: HOSPADM

## 2023-11-27 RX ORDER — ONDANSETRON 2 MG/ML
4 INJECTION INTRAMUSCULAR; INTRAVENOUS EVERY 6 HOURS PRN
Status: DISCONTINUED | OUTPATIENT
Start: 2023-11-27 | End: 2023-11-28 | Stop reason: HOSPADM

## 2023-11-27 RX ORDER — OXYTOCIN/0.9 % SODIUM CHLORIDE 30/500 ML
1-24 PLASTIC BAG, INJECTION (ML) INTRAVENOUS CONTINUOUS
Status: DISCONTINUED | OUTPATIENT
Start: 2023-11-27 | End: 2023-11-28 | Stop reason: HOSPADM

## 2023-11-27 RX ORDER — SODIUM CHLORIDE, SODIUM LACTATE, POTASSIUM CHLORIDE, CALCIUM CHLORIDE 600; 310; 30; 20 MG/100ML; MG/100ML; MG/100ML; MG/100ML
INJECTION, SOLUTION INTRAVENOUS CONTINUOUS PRN
Status: DISCONTINUED | OUTPATIENT
Start: 2023-11-27 | End: 2023-11-28 | Stop reason: HOSPADM

## 2023-11-27 RX ORDER — ONDANSETRON 4 MG/1
4 TABLET, ORALLY DISINTEGRATING ORAL EVERY 6 HOURS PRN
Status: DISCONTINUED | OUTPATIENT
Start: 2023-11-27 | End: 2023-11-28 | Stop reason: HOSPADM

## 2023-11-27 RX ORDER — LIDOCAINE 40 MG/G
CREAM TOPICAL
Status: DISCONTINUED | OUTPATIENT
Start: 2023-11-27 | End: 2023-11-28 | Stop reason: HOSPADM

## 2023-11-27 RX ORDER — NALBUPHINE HYDROCHLORIDE 20 MG/ML
2.5-5 INJECTION, SOLUTION INTRAMUSCULAR; INTRAVENOUS; SUBCUTANEOUS EVERY 6 HOURS PRN
Status: DISCONTINUED | OUTPATIENT
Start: 2023-11-27 | End: 2023-11-28

## 2023-11-27 RX ADMIN — Medication 2 MILLI-UNITS/MIN: at 15:56

## 2023-11-27 RX ADMIN — FENTANYL CITRATE 50 MCG: 50 INJECTION INTRAMUSCULAR; INTRAVENOUS at 23:05

## 2023-11-27 RX ADMIN — METOCLOPRAMIDE HYDROCHLORIDE 10 MG: 5 INJECTION INTRAMUSCULAR; INTRAVENOUS at 20:44

## 2023-11-27 RX ADMIN — MISOPROSTOL 25 MCG: 100 TABLET ORAL at 11:51

## 2023-11-27 RX ADMIN — MISOPROSTOL 25 MCG: 100 TABLET ORAL at 13:51

## 2023-11-27 RX ADMIN — MISOPROSTOL 25 MCG: 100 TABLET ORAL at 09:47

## 2023-11-27 RX ADMIN — SODIUM CHLORIDE, POTASSIUM CHLORIDE, SODIUM LACTATE AND CALCIUM CHLORIDE: 600; 310; 30; 20 INJECTION, SOLUTION INTRAVENOUS at 21:22

## 2023-11-27 ASSESSMENT — ACTIVITIES OF DAILY LIVING (ADL)
ADLS_ACUITY_SCORE: 18

## 2023-11-27 NOTE — PROVIDER NOTIFICATION
23   Provider Notification   Provider Name/Title Dr. Shea   Method of Notification Phone   Request Evaluate - Remote   Notification Reason Patient Arrived;Membrane Status;Labor Status;Uterine Activity;Pain;SVE;Status Update     Updated Dr. Shea via phone. Pt arrived for scheduled induction d/t gallstones. Pt is 39w and is a . Pt is A positive and GBS negative. SVE on arrival 1.5/60/-3. Covarrubias score of 4. FHR baseline 155 with accelerations, 1 variable. Pt christopher q5-6 minutes but is not feeling them. No complaints of pain. Plan is to get an epidural when the time comes. Vitals stable.    Orders received for intrapartum orders and to start cervidil. MD okay with visceral and epidural when/if needed. Will update MD if needed.

## 2023-11-27 NOTE — H&P
"OB Brief Admit H&P    No significant change in general health status based on examination of the patient, review of Nursing Admission Database and prenatal record.    Pt is a 33 year old  @ 39w0d who presented to L&D for IOL for gallstones/cholecystitis during pregnancy.    Patient's prenatal course has been complicated by   -Calculus of gallbladder with cholecystitis: workup in ER on  (see EPIC), declines surgical intervention; saw gen surgery 23  -Marginal insertion of umbilical cord -Marginal at 20 wk scan, not seen with Level II US at [facility]  -BMI 18.5    -HPV- repeat pap smear post partum    Prenatal Labs:    Blood type A+  Hep Bs Ag neg  HIV neg  RPR NR  Hep C antibody negative  Rubella immune  GCT passed  GBS negative   No flu or tdap shot     EFW: 7.5#    /64 (BP Location: Left arm, Patient Position: Semi-Arana's, Cuff Size: Adult Regular)   Temp 98.3  F (36.8  C) (Oral)   Resp 16   Ht 1.626 m (5' 4\")   Wt 64 kg (141 lb)   LMP 2023   BMI 24.20 kg/m    EFM:  155 +accels, no decels, mod variability   [place]: Q5-7 min   SVE: 1-2/50/-3  Membranes:  intact     Assessment:  33 year old  @ 39w0d admitted for IOL for gallstones in pregnancy    Plan:  1. Admit to labor and delivery   2. Admit labs, okay with epidural   3. GBS negative   4. Cervadil induction   5. Cholecystitis- will get rpt cmp to reassess liver panel     Yolanda Shea MD  2023  8:59 PM    "

## 2023-11-27 NOTE — PROVIDER NOTIFICATION
11/27/23 0855   Provider Notification   Provider Name/Title Dr. Tucker   Method of Notification At Bedside   Request Evaluate in Person   Notification Reason SVE     SVE 2/70/-3. Patient feels occasional cramping. UC's noted q 4.5-5 minutes. Plan per provider: begin Misoprostol PO when able.

## 2023-11-27 NOTE — PLAN OF CARE
Pt able to rest overnight. FHR strip has baseline HR of 145-155 with moderate variability, accelerations present, no decels. Pt is christopher q1.5-6 minutes. Pt having pain during contractions but is able to breath through contractions and states is able to get rest. Vitals have been stable.

## 2023-11-27 NOTE — PROVIDER NOTIFICATION
11/27/23 1417   Provider Notification   Provider Name/Title Dr. Tucker   Method of Notification Phone   Request Evaluate - Remote     Patient has had 3 doses of Cytotec. Feels occasional cramping.  Plan per provider: Will come and assess patient.

## 2023-11-27 NOTE — PROVIDER NOTIFICATION
11/27/23 0006   Provider Notification   Provider Name/Title Dr. Shea   Method of Notification Phone   Request Evaluate - Remote   Notification Reason Labor Status;Uterine Activity;Pain;Status Update;SVE     Updated Dr. Shea via phone. Pt becoming more uncomfortable, but not requiring epidural. SVE 2/70/-3. Covarrubias 5. FHR strip with moderate variability, accelerations present, no decelerations. Yazan q2-4 minutes. Cervidil still in place. CMP labs reviewed with provider. No new orders.

## 2023-11-27 NOTE — PROVIDER NOTIFICATION
11/27/23 1436   Provider Notification   Provider Name/Title Dr. Tucker   Method of Notification At Bedside   Request Evaluate in Person   Notification Reason SVE     SVE 2/70/-3, no change. Bedside US scan done, vertex presentation. Plan per provider: begin Pitocin when able.

## 2023-11-27 NOTE — PROGRESS NOTES
"OB Progress Note  2023  9:23 AM    S:  Pt with good pain control.  No other complaints.      O:  /61   Temp 97.7  F (36.5  C) (Oral)   Resp 18   Ht 1.626 m (5' 4\")   Wt 64 kg (141 lb)   LMP 2023   BMI 24.20 kg/m    EFM: baseline 140, accelerations present, no decelerations, moderate variability; Category 1  [place]:  Ctx q5-6 min  SVE:  2/70%/-4  Membranes: intact    Pitocin at 0 mU/min    A/P:  33 year old  @39w1d admitted for induction of labor for gallstones/cholecysitis.  Cervidil removed  1.  Routine cares  2.  Start PO cytotec. AROM when able.   3.  Anticipate     Linda Awilda Tucker MD  "

## 2023-11-28 ENCOUNTER — APPOINTMENT (OUTPATIENT)
Dept: INTERPRETER SERVICES | Facility: CLINIC | Age: 33
End: 2023-11-28
Payer: COMMERCIAL

## 2023-11-28 LAB — HGB BLD-MCNC: 12.8 G/DL (ref 11.7–15.7)

## 2023-11-28 PROCEDURE — 250N000013 HC RX MED GY IP 250 OP 250 PS 637: Performed by: OBSTETRICS & GYNECOLOGY

## 2023-11-28 PROCEDURE — 250N000011 HC RX IP 250 OP 636: Performed by: ANESTHESIOLOGY

## 2023-11-28 PROCEDURE — 120N000001 HC R&B MED SURG/OB

## 2023-11-28 PROCEDURE — 3E0R3BZ INTRODUCTION OF ANESTHETIC AGENT INTO SPINAL CANAL, PERCUTANEOUS APPROACH: ICD-10-PCS | Performed by: ANESTHESIOLOGY

## 2023-11-28 PROCEDURE — 250N000011 HC RX IP 250 OP 636: Mod: JZ | Performed by: OBSTETRICS & GYNECOLOGY

## 2023-11-28 PROCEDURE — 0KQM0ZZ REPAIR PERINEUM MUSCLE, OPEN APPROACH: ICD-10-PCS | Performed by: OBSTETRICS & GYNECOLOGY

## 2023-11-28 PROCEDURE — 00HU33Z INSERTION OF INFUSION DEVICE INTO SPINAL CANAL, PERCUTANEOUS APPROACH: ICD-10-PCS | Performed by: ANESTHESIOLOGY

## 2023-11-28 PROCEDURE — 85018 HEMOGLOBIN: CPT | Performed by: OBSTETRICS & GYNECOLOGY

## 2023-11-28 PROCEDURE — 36415 COLL VENOUS BLD VENIPUNCTURE: CPT | Performed by: OBSTETRICS & GYNECOLOGY

## 2023-11-28 PROCEDURE — 722N000001 HC LABOR CARE VAGINAL DELIVERY SINGLE

## 2023-11-28 PROCEDURE — 258N000003 HC RX IP 258 OP 636: Performed by: OBSTETRICS & GYNECOLOGY

## 2023-11-28 PROCEDURE — 250N000011 HC RX IP 250 OP 636: Mod: JZ | Performed by: ANESTHESIOLOGY

## 2023-11-28 RX ORDER — METHYLERGONOVINE MALEATE 0.2 MG/ML
200 INJECTION INTRAVENOUS
Status: DISCONTINUED | OUTPATIENT
Start: 2023-11-28 | End: 2023-11-29 | Stop reason: HOSPADM

## 2023-11-28 RX ORDER — MISOPROSTOL 200 UG/1
800 TABLET ORAL
Status: DISCONTINUED | OUTPATIENT
Start: 2023-11-28 | End: 2023-11-29 | Stop reason: HOSPADM

## 2023-11-28 RX ORDER — NALOXONE HYDROCHLORIDE 0.4 MG/ML
0.4 INJECTION, SOLUTION INTRAMUSCULAR; INTRAVENOUS; SUBCUTANEOUS
Status: DISCONTINUED | OUTPATIENT
Start: 2023-11-28 | End: 2023-11-29 | Stop reason: HOSPADM

## 2023-11-28 RX ORDER — IBUPROFEN 800 MG/1
800 TABLET, FILM COATED ORAL EVERY 6 HOURS PRN
Status: DISCONTINUED | OUTPATIENT
Start: 2023-11-28 | End: 2023-11-29 | Stop reason: HOSPADM

## 2023-11-28 RX ORDER — NALOXONE HYDROCHLORIDE 0.4 MG/ML
0.2 INJECTION, SOLUTION INTRAMUSCULAR; INTRAVENOUS; SUBCUTANEOUS
Status: DISCONTINUED | OUTPATIENT
Start: 2023-11-28 | End: 2023-11-29 | Stop reason: HOSPADM

## 2023-11-28 RX ORDER — OXYCODONE HYDROCHLORIDE 5 MG/1
5 TABLET ORAL EVERY 4 HOURS PRN
Status: DISCONTINUED | OUTPATIENT
Start: 2023-11-28 | End: 2023-11-29 | Stop reason: HOSPADM

## 2023-11-28 RX ORDER — MISOPROSTOL 200 UG/1
400 TABLET ORAL
Status: DISCONTINUED | OUTPATIENT
Start: 2023-11-28 | End: 2023-11-29 | Stop reason: HOSPADM

## 2023-11-28 RX ORDER — BUPIVACAINE HYDROCHLORIDE 2.5 MG/ML
INJECTION, SOLUTION EPIDURAL; INFILTRATION; INTRACAUDAL PRN
Status: DISCONTINUED | OUTPATIENT
Start: 2023-11-28 | End: 2023-11-28

## 2023-11-28 RX ORDER — DOCUSATE SODIUM 100 MG/1
100 CAPSULE, LIQUID FILLED ORAL DAILY
Status: DISCONTINUED | OUTPATIENT
Start: 2023-11-28 | End: 2023-11-29 | Stop reason: HOSPADM

## 2023-11-28 RX ORDER — MODIFIED LANOLIN
OINTMENT (GRAM) TOPICAL
Status: DISCONTINUED | OUTPATIENT
Start: 2023-11-28 | End: 2023-11-29 | Stop reason: HOSPADM

## 2023-11-28 RX ORDER — OXYTOCIN/0.9 % SODIUM CHLORIDE 30/500 ML
340 PLASTIC BAG, INJECTION (ML) INTRAVENOUS CONTINUOUS PRN
Status: DISCONTINUED | OUTPATIENT
Start: 2023-11-28 | End: 2023-11-29 | Stop reason: HOSPADM

## 2023-11-28 RX ORDER — TRANEXAMIC ACID 10 MG/ML
1 INJECTION, SOLUTION INTRAVENOUS EVERY 30 MIN PRN
Status: DISCONTINUED | OUTPATIENT
Start: 2023-11-28 | End: 2023-11-29 | Stop reason: HOSPADM

## 2023-11-28 RX ORDER — BISACODYL 10 MG
10 SUPPOSITORY, RECTAL RECTAL DAILY PRN
Status: DISCONTINUED | OUTPATIENT
Start: 2023-11-28 | End: 2023-11-29 | Stop reason: HOSPADM

## 2023-11-28 RX ORDER — CARBOPROST TROMETHAMINE 250 UG/ML
250 INJECTION, SOLUTION INTRAMUSCULAR
Status: DISCONTINUED | OUTPATIENT
Start: 2023-11-28 | End: 2023-11-29 | Stop reason: HOSPADM

## 2023-11-28 RX ORDER — HYDROCORTISONE 25 MG/G
CREAM TOPICAL 3 TIMES DAILY PRN
Status: DISCONTINUED | OUTPATIENT
Start: 2023-11-28 | End: 2023-11-29 | Stop reason: HOSPADM

## 2023-11-28 RX ORDER — OXYTOCIN 10 [USP'U]/ML
10 INJECTION, SOLUTION INTRAMUSCULAR; INTRAVENOUS
Status: DISCONTINUED | OUTPATIENT
Start: 2023-11-28 | End: 2023-11-29 | Stop reason: HOSPADM

## 2023-11-28 RX ORDER — ACETAMINOPHEN 325 MG/1
650 TABLET ORAL EVERY 4 HOURS PRN
Status: DISCONTINUED | OUTPATIENT
Start: 2023-11-28 | End: 2023-11-29 | Stop reason: HOSPADM

## 2023-11-28 RX ADMIN — ACETAMINOPHEN 650 MG: 325 TABLET, FILM COATED ORAL at 18:08

## 2023-11-28 RX ADMIN — Medication 1 LOZENGE: at 22:54

## 2023-11-28 RX ADMIN — ACETAMINOPHEN 650 MG: 325 TABLET, FILM COATED ORAL at 14:01

## 2023-11-28 RX ADMIN — Medication: at 00:22

## 2023-11-28 RX ADMIN — IBUPROFEN 800 MG: 800 TABLET, FILM COATED ORAL at 18:08

## 2023-11-28 RX ADMIN — Medication 1 LOZENGE: at 09:56

## 2023-11-28 RX ADMIN — DOCUSATE SODIUM 100 MG: 100 CAPSULE, LIQUID FILLED ORAL at 09:56

## 2023-11-28 RX ADMIN — Medication 1 LOZENGE: at 19:26

## 2023-11-28 RX ADMIN — ACETAMINOPHEN 650 MG: 325 TABLET, FILM COATED ORAL at 22:45

## 2023-11-28 RX ADMIN — BUPIVACAINE HYDROCHLORIDE 15 ML: 2.5 INJECTION, SOLUTION EPIDURAL; INFILTRATION; INTRACAUDAL at 00:08

## 2023-11-28 RX ADMIN — KETOROLAC TROMETHAMINE 30 MG: 30 INJECTION, SOLUTION INTRAMUSCULAR at 01:52

## 2023-11-28 RX ADMIN — SODIUM CHLORIDE, POTASSIUM CHLORIDE, SODIUM LACTATE AND CALCIUM CHLORIDE: 600; 310; 30; 20 INJECTION, SOLUTION INTRAVENOUS at 00:07

## 2023-11-28 RX ADMIN — ACETAMINOPHEN 650 MG: 325 TABLET, FILM COATED ORAL at 06:44

## 2023-11-28 RX ADMIN — IBUPROFEN 800 MG: 800 TABLET, FILM COATED ORAL at 11:54

## 2023-11-28 ASSESSMENT — ACTIVITIES OF DAILY LIVING (ADL)
ADLS_ACUITY_SCORE: 18

## 2023-11-28 NOTE — PROVIDER NOTIFICATION
11/27/23 2200   Provider Notification   Provider Name/Title Dr. Garza   Method of Notification Phone   Request Evaluate - Remote   Notification Reason Fetal Baseline Change;SVE;Uterine Activity;Decels;Status Update       Updated MD of fetal tachycardia since 2000, half hour period of intermittent variable decelerations, SVE done and presumed unchanged, station -3, unable to assess further. Fluid bolus, pt repositioned, now baseline 170, moderate variability, no decelerations, no accelerations. Ctx were q 2-2.5 min and have now spaced since fluid bolus, RN will continue to titrate pitocin as able,currently at 14 mu/ min.     MD will review FHT and call back.

## 2023-11-28 NOTE — PROGRESS NOTES
"OB Progress Note    S:  Feeling some cramping. Patient consents to cervical check and AROM. IV fentanyl given for pain control during AROM.     Did have a period of fetal tachycardia that has now resolved.      O:  /58   Temp 98  F (36.7  C) (Oral)   Resp 16   Ht 1.626 m (5' 4\")   Wt 64 kg (141 lb)   LMP 2023   BMI 24.20 kg/m    EFM: baseline 160, accelerations present, intermittent variable decelerations, moderate variability; Category 1  [place]:  1.5 - 4 minutes  SVE:  4/70%/-3  Membranes: AROM with clear fluid    Pitocin at 16 mU/min    A/P:  33 year old  @39w1d admitted for induction of labor for gallstones/cholecysitis.  1.  Routine cares  2.  S/p cervidil and PO cytotec. On pitocin. Now s/p AROM.    3.  Anticipate     MD [facility]  2023 11:19 PM   "

## 2023-11-28 NOTE — PLAN OF CARE
Data: Vital signs within normal limits. Postpartum checks within normal limits - see flow record. Patient eating and drinking normally. Patient able to empty bladder independently and is up ambulating. Patient performing self cares, is able to care for infant and is breast or formula feeding every 2-3 hours.  Using ice and tucks for discomfort.   Action: Given cough drop for throat dryness. See MAR. Adequate pain control noted by patient. Patient education done, see flow record.  Response: Positive attachment behaviors observed with infant. Patient's family present this shift.   Plan: Continue current plan of care.  Anticipate discharge on 11/29.

## 2023-11-28 NOTE — PROGRESS NOTES
"OB Progress Note    S:  Feeling some cramping. Patient consents to cervical check and AROM.    Did have a period of fetal tachycardia that has now resolved.      O:  /58   Temp 98  F (36.7  C) (Oral)   Resp 16   Ht 1.626 m (5' 4\")   Wt 64 kg (141 lb)   LMP 2023   BMI 24.20 kg/m    EFM: baseline 160, accelerations present, intermittent variable decelerations, moderate variability; Category 1  [place]:  1.5 - 4 minutes  SVE:  4/70%/-3  Membranes: AROM with clear fluid    Pitocin at 16 mU/min    A/P:  33 year old  @39w1d admitted for induction of labor for gallstones/cholecysitis.  1.  Routine cares  2.  S/p cervidil and PO cytotec. On pitocin. Now s/p AROM.    3.  Anticipate     Daysi Garza MD   [facility]  2023 11:19 PM   "

## 2023-11-28 NOTE — L&D DELIVERY NOTE
OB Delivery Summary      HISTORY OF PRESENT ILLNESS:   with  IUP @ 39w2d who presented for induction of labor for .gallstones/cholecystitis in pregnancy.  Her prenatal course was  complicated by cholecystitis earlier in pregnancy (s/p general sugery, declined surgical intervention during pregnancy), marginal cord insertion, BMI 18.5.  Prenatal labs were significant for blood type A+, rubella status immune.  Glucose challenge test passed.  Group beta strep culture was negative.  Estimated fetal weight on admission was approximately 7.5lb        FIRST STAGE OF LABOR:  The patient was admitted to Labor and Delivery with a fetal heart rate tracing that showed category I, reactive. Cervix was 1-2/50/-3. She received cervidil for cervical ripening. The next morning she received PO cytotec x3, then was started on pitocin. At 2315 AROM was performed for clear fluid. She did receive IV fentanyl for pain assistance during AROM. She progressed in labor and received an epidural. She was found to be complete shortly after.       SECOND STAGE OF LABOR:  The patient began pushing. With one contraction she brought the vertex down in the birth canal and delivered a viable female infant at 0127.  After delivery of the head, the anterior shoulder and body delivered without difficulty. The infant was placed on the mother's chest.  Delayed cord clamping was performed.        THIRD STAGE OF LABOR:  The cord was clamped and cut. The placenta then delivered spontaneously and appeared intact with a 3-vessel cord.  Pitocin was started and fundal massage was performed.  Perineum was inspected and a small second degree laceration was noted. It was repaired with 3-0 vicryl in the usual fashion. 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 This was repaired with 4-0 vicryl in a running fashion. Quantitative blood loss for the delivery was 117 ml.        Both mother and baby tolerated delivery well and there were no complications. Fetal weight was 6lb 11oz and Apgars were 8 and 9 at 1 and 5 minutes, respectively.       Daysi Garza MD  11/28/2023  2:02 AM

## 2023-11-28 NOTE — PROVIDER NOTIFICATION
11/27/23 2300   Provider Notification   Provider Name/Title Dr. Garza   Method of Notification At Bedside   Request Evaluate in Person   Notification Reason Membrane Status;SVE       MD at bedside, SVE 4, offered AROM, pt consents, pt given 50 mcg of fentanyl IV per MD and amniotomy performed for clear fluid

## 2023-11-28 NOTE — ANESTHESIA PREPROCEDURE EVALUATION
Anesthesia Pre-Procedure Evaluation    Patient: Jaxon Dempsey   MRN: [id number] : 1990        Procedure :           Past Medical History:   Diagnosis Date    Gall stones       History reviewed. No pertinent surgical history.   No Known Allergies   Social History     Tobacco Use    Smoking status: Never    Smokeless tobacco: Never   Substance Use Topics    Alcohol use: Never      Wt Readings from Last 1 Encounters:   23 64 kg (141 lb)        Anesthesia Evaluation   Pt has had prior anesthetic. Type: General and OB Labor Epidural.    No history of anesthetic complications       ROS/MED HX  ENT/Pulmonary:  - neg pulmonary ROS     Neurologic:  - neg neurologic ROS     Cardiovascular:  - neg cardiovascular ROS     METS/Exercise Tolerance:     Hematologic:  - neg hematologic  ROS     Musculoskeletal:       GI/Hepatic:  - neg GI/hepatic ROS     Renal/Genitourinary:       Endo:  - neg endo ROS     Psychiatric/Substance Use:  - neg psychiatric ROS     Infectious Disease:       Malignancy:       Other:     (-) previous  and TOLAC candidate       Physical Exam    Airway        Mallampati: I   TM distance: > 3 FB   Neck ROM: full   Mouth opening: > 3 cm    Respiratory Devices and Support         Dental  no notable dental history         Cardiovascular   cardiovascular exam normal          Pulmonary   pulmonary exam normal            Other findings: Lab Test        23                       2115          1043                    1432          WBC          11.7*         --          9.5          8.4           HGB          12.9         12.8         13.5         13.2          MCV          94            --          93           95            PLT          204           --          155          229            Lab Test        23          1346          NA           133*         136           "137           POTASSIUM    3.6          3.2*         3.4           CHLORIDE     101          103          102           CO2          18*          19*          22            BUN          9.7          6.6          8.5           CR           0.43*        0.40*        0.49*         ANIONGAP     14           14           13            LUISANA          8.0*         8.4*         9.2           GLC          84           73           83              OUTSIDE LABS:  CBC:   Lab Results   Component Value Date    WBC 11.7 (H) 11/26/2023    WBC 9.5 06/30/2023    HGB 12.9 11/26/2023    HGB 12.8 09/12/2023    HCT 36.2 11/26/2023    HCT 38.2 06/30/2023     11/26/2023     06/30/2023     BMP:   Lab Results   Component Value Date     (L) 11/26/2023     06/30/2023    POTASSIUM 3.6 11/26/2023    POTASSIUM 3.2 (L) 06/30/2023    CHLORIDE 101 11/26/2023    CHLORIDE 103 06/30/2023    CO2 18 (L) 11/26/2023    CO2 19 (L) 06/30/2023    BUN 9.7 11/26/2023    BUN 6.6 06/30/2023    CR 0.43 (L) 11/26/2023    CR 0.40 (L) 06/30/2023    GLC 84 11/26/2023    GLC 73 06/30/2023     COAGS: No results found for: \"PTT\", \"INR\", \"FIBR\"  POC: No results found for: \"BGM\", \"HCG\", \"HCGS\"  HEPATIC:   Lab Results   Component Value Date    ALBUMIN 3.0 (L) 11/26/2023    PROTTOTAL 5.6 (L) 11/26/2023    ALT 89 (H) 11/26/2023    AST 80 (H) 11/26/2023    ALKPHOS 380 (H) 11/26/2023    BILITOTAL 0.6 11/26/2023     OTHER:   Lab Results   Component Value Date    LUISANA 8.0 (L) 11/26/2023    LIPASE 14 06/30/2023    TSH 1.33 01/20/2023       Anesthesia Plan    ASA Status:  2       Anesthesia Type: Epidural.              Consents    Anesthesia Plan(s) and associated risks, benefits, and realistic alternatives discussed. Questions answered and patient/representative(s) expressed understanding.     - Discussed:     - Discussed with:  Patient            Postoperative Care            Comments:           neg OB ROS.      Chilo Ryan MD    I have reviewed the " pertinent notes and labs in the chart from the past 30 days and (re)examined the patient.  Any updates or changes from those notes are reflected in this note.

## 2023-11-28 NOTE — PROVIDER NOTIFICATION
11/27/23 2220   Provider Notification   Provider Name/Title Dr. Garza   Method of Notification Phone   Request Evaluate - Remote   Notification Reason Status Update       MD reviewed tracing and plans to come to bedside to evaluate.

## 2023-11-28 NOTE — PROGRESS NOTES
"OB Post-partum Note  PPD# 0    S:  Patient doing well.  Pain controlled.  Voiding.  Bleeding is normal.  Breast feeding. Patient reports dry cough, but no sore throat, congestion, body aches or fever.     O:  /53   Pulse 72   Temp 98.4  F (36.9  C) (Oral)   Resp 18   Ht 1.626 m (5' 4\")   Wt 64 kg (141 lb)   LMP 2023   SpO2 94%   Breastfeeding Unknown   BMI 24.20 kg/m    Gen- A&O, NAD  Abd- Non-tender, fundus non tender and firm   Ext- non-tender, no calf pain    Hemoglobin   Date Value Ref Range Status   2023 12.8 11.7 - 15.7 g/dL Final   2021 12.7 11.7 - 15.7 g/dL Final     A POS  Rubella Immune    A/P: 33 year old  PPD# 0 s/p .  Dry cough without systemic or local signs of infectious process.      1.  Routine post-partum cares  2.  Analgesia with Ibuprofen and/or Tylenol  3.  Discharge is planned for tomorrow  4.  The plan of care was discussed with the patient.  She expressed understanding and agreement  5.  Throat lozenges for dry cough, further measures if necessary. (Dextromethorphan).      Lyndon Garcia MD  2023  9:04 AM  "

## 2023-11-28 NOTE — PROVIDER NOTIFICATION
11/27/23 1730   Provider Notification   Provider Name/Title Dr. Cordova   Method of Notification Phone   Request Evaluate - Remote   Notification Reason Status Update     More uncomfortable now. 6 nataliia units of Pitocin infusing. Update given.

## 2023-11-28 NOTE — CARE PLAN
Data: Jaxon Dempsey transferred to [facility] via wheelchair at 0630. Baby transferred via parent's arms.  Action: Receiving unit notified of transfer: Yes. Patient and family notified of room change. Report given to Delfina at 0700. Belongings sent to receiving unit. Accompanied by Registered Nurse. Oriented patient to surroundings. Call light within reach. ID bands double-checked with receiving RN.  Response: Patient tolerated transfer and is stable.  Patients mobililty level scored using the bedside mobility assistance tool (BMAT). Patient is at a mobility level test number: 3. Mobility equipment used: wheelchair. Required assist of 1 staff members. Further use of BMAT scoring required.

## 2023-11-28 NOTE — ANESTHESIA PROCEDURE NOTES
"Epidural catheter Procedure Note    Pre-Procedure   Staff -        Anesthesiologist:  Chilo Ryan MD       Performed By: anesthesiologist       Location: OB  Procedure Documentation  Procedure: epidural catheter   Comments:  Pt seen and interviewed prior to epidural placement for labor analgesia.  This epidural is to be placed in anticipation of normal vaginal delivery.  Risk discussed and consent given prior to performance of procedure.  Time out consisting of identification of patient and desire for epidural analgesia was confirmed.  Sterile prep of lumbar spine was accomplished with povidone iodine three times.  L23 interspace was identified.  Local anesthetic infiltration with 1.5% lido with epi 1/200k was performed with 1.5 cc.  17 G Tuohy needle was advanced in the midline with loss of resistance technique.  No CSF, blood, or paresthesias were noted with placement.  Test dose of 1.5% Lidocaine with epi 1/200k, 3 cc., was injected without evidence of intrathecal or intravascular injection.  Incremental bolus of 0.25% Bupivicaine was injected to a total volume of 15 cc.  Epidural cath 19 G was advanced through needle approx. 6 cm.  No paresthesia was noted with placement and aspiration of cath was negative for blood.  Catheter secured with tegaderm and tape.  Epidural infusion begun after double check of pump settings.  Nursing to inform if there are any complications, but none were noted prior to leaving patient room.  I, or my partners, remain immediately available for management of any issues or complications.  I, or my partners, will monitor at appropriate intervals.  ASH Ryan MD      FOR [facility]) ONLY:   Pain Team Contact information: please page the Pain Team Via [employer]. Search \"Pain\". During daytime hours, please page the attending first. At night please page the resident first.      "

## 2023-11-29 ENCOUNTER — APPOINTMENT (OUTPATIENT)
Dept: INTERPRETER SERVICES | Facility: CLINIC | Age: 33
End: 2023-11-29
Payer: COMMERCIAL

## 2023-11-29 VITALS
SYSTOLIC BLOOD PRESSURE: 115 MMHG | HEIGHT: 64 IN | RESPIRATION RATE: 20 BRPM | HEART RATE: 77 BPM | DIASTOLIC BLOOD PRESSURE: 67 MMHG | BODY MASS INDEX: 24.07 KG/M2 | OXYGEN SATURATION: 94 % | TEMPERATURE: 97.7 F | WEIGHT: 141 LBS

## 2023-11-29 PROCEDURE — 250N000013 HC RX MED GY IP 250 OP 250 PS 637: Performed by: OBSTETRICS & GYNECOLOGY

## 2023-11-29 PROCEDURE — 999N000079 HC STATISTIC IP LACTATION SERVICES 1-15 MIN

## 2023-11-29 RX ORDER — IBUPROFEN 600 MG/1
600 TABLET, FILM COATED ORAL EVERY 6 HOURS PRN
Qty: 40 TABLET | Refills: 1 | Status: SHIPPED | OUTPATIENT
Start: 2023-11-29

## 2023-11-29 RX ORDER — DOCUSATE SODIUM 100 MG/1
100 CAPSULE, LIQUID FILLED ORAL DAILY
Qty: 60 CAPSULE | Refills: 1 | Status: SHIPPED | OUTPATIENT
Start: 2023-11-30

## 2023-11-29 RX ORDER — GUAIFENESIN/DEXTROMETHORPHAN 100-10MG/5
10 SYRUP ORAL EVERY 4 HOURS PRN
Qty: 118 ML | Refills: 1 | Status: SHIPPED | OUTPATIENT
Start: 2023-11-29

## 2023-11-29 RX ORDER — ACETAMINOPHEN 325 MG/1
650 TABLET ORAL EVERY 4 HOURS PRN
Qty: 40 TABLET | Refills: 1 | Status: SHIPPED | OUTPATIENT
Start: 2023-11-29

## 2023-11-29 RX ADMIN — DOCUSATE SODIUM 100 MG: 100 CAPSULE, LIQUID FILLED ORAL at 08:16

## 2023-11-29 RX ADMIN — IBUPROFEN 800 MG: 800 TABLET, FILM COATED ORAL at 12:09

## 2023-11-29 RX ADMIN — IBUPROFEN 800 MG: 800 TABLET, FILM COATED ORAL at 02:49

## 2023-11-29 ASSESSMENT — ACTIVITIES OF DAILY LIVING (ADL)
ADLS_ACUITY_SCORE: 18

## 2023-11-29 NOTE — DISCHARGE INSTRUCTIONS
Warning Signs after Having a Baby    Keep this paper on your fridge or somewhere else where you can see it.    Call your provider if you have any of these symptoms up to 12 weeks after having your baby.    Thoughts of hurting yourself or your baby  Pain in your chest or trouble breathing  Severe headache not helped by pain medicine  Eyesight concerns (blurry vision, seeing spots or flashes of light, other changes to eyesight)  Fainting, shaking or other signs of a seizure    Call 9-1-1 if you feel that it is an emergency.     The symptoms below can happen to anyone after giving birth. They can be very serious. Call your provider if you have any of these warning signs.    My provider s phone number: _______________________    Losing too much blood (hemorrhage)    Call your provider if you soak through a pad in less than an hour or pass blood clots bigger than a golf ball. These may be signs that you are bleeding too much.    Blood clots in the legs or lungs    After you give birth, your body naturally clots its blood to help prevent blood loss. Sometimes this increased clotting can happen in other areas of the body, like the legs or lungs. This can block your blood flow and be very dangerous.     Call your provider if you:  Have a red, swollen spot on the back of your leg that is warm or painful when you touch it.   Are coughing up blood.     Infection    Call your provider if you have any of these symptoms:  Fever of 100.4 F (38 C) or higher.  Pain or redness around your stitches if you had an incision.   Any yellow, white, or green fluid coming from places where you had stitches or surgery.    Mood Problems (postpartum depression)    Many people feel sad or have mood changes after having a baby. But for some people, these mood swings are worse.     Call your provider right away if you feel so anxious or nervous that you can't care for yourself or your baby.    Preeclampsia (high blood pressure)    Even if you  didn't have high blood pressure when you were pregnant, you are at risk for the high blood pressure disease called preeclampsia. This risk can last up to 12 weeks after giving birth.     Call your provider if you have:   Pain on your right side under your rib cage  Sudden swelling in the hands and face    Remember: You know your body. If something doesn't feel right, get medical help.     For informational purposes only. Not to replace the advice of your health care provider. Copyright 2020 [facility]. All rights reserved. Clinically reviewed by Latonia Car, RNC-OB, MSN. [employer] 985162 - Rev 02/23.    Vaginal Delivery Discharge Instructions: [place]  [place]:   Waydiiso qoyska iyo saaxibadaa inay ku caawiyaan markaad u baahantahay.  Waxba infante kor saarin ama infante galin farjigaaga ilaa uu dhakhtarkaagu ansixiyo.  Si fudud u qaado dhowrka asbuuc e xiga si aad u ogalaato in [facility] ede kabto. Waxaad samayn kartaa howl kasta oo aad rabto ilaa meeshaas laga gaarayo.  Gaadhi infante wadin markaad qaadanayso  kaniiniyada xanuunka ee dhkhatarku kuu qoray . waxaad gaadhi wadi kartaa haddii aad qaadanayso kaniiniyada xanuunka ee koontarka.    [facility] bixiyahaaga daryeelka caafimaaad haddii aad qabtid mid ka mid ah calaamadahan ede socda:  Haddii suufka dhiigga nuuga uu ku buuxsamo [facility] gudihiis, ama aad aragto xinjiro dhiig ah oo ka wayn kubadda golafka.   Dhiig soconaya wax kabadan 6 asbuuc.  Haddii aad qabto dheecaan farjiga ka imaanaya oo  si xun u uraya.   [facility] 100.4  F (38  C) am aka sii saraysa (heerkulka laga qaaday carabka hoostiiisa), oo qarqaryo leh ama aan lahanuris Lizama majiirid daran oo aad ka dareeto qaybta hoose ee ubucda.  alissa Paris, yanni ama dheecaan ka dhiimaya meesha la tolay ee qaliinka.  Noraadi badpatricio oo dagdakhadijah armstrong oo markasta ku qabanaysa , ama gubasho aad dareento markaad kaajayso.  alissa Suarez, ama xanuun aad ka dareento xididada lugta.  Dhibaato kaa  haysata naas nuujinta, ama guduudasho ama xanuun naaska ah.  Xanuun sii kordhaya ama aan ka dhamaanayn meesha la tolay ama danqashada dilaaca.  Lalabbo ama matag.  Xabad xanuun iyo qufac ama naqaska oo kugu dhagaya.  Dhibaato ay la socoto mursyed cai, chari kaufman.   Haddii aad qabtid wax walwal ah oo ku saabsan inaad waxyeelayso naftaada ama ilmaha, wac dhakhtarka erin markiiba.   Haddii aad qabto robles aalo ama bettywal markaad guriga ku noqoto kadib.    Gacmahaagga nadiifi:  Markasta dhaqa gacahaaga ka hor inta aadan taaban farjiga agagaarkiisa  iyo meesha la tolay. Jonathan waxay caawiniaysaa inuu yaraado infakshanku. Hdii gacmahaagu aysan wasakh jan, raeaad isticmaali kartaa aalkalo aad gacmaha ku tirtirto si aad u nadiifiso gacmahaaga. Cidiyahaaga nadiifi ooo jar.      Vaginal Delivery Discharge Instructions  Activity:   Ask family and friends for help when you need it.  Do not place anything in your vagina until your doctor approves.  Take it easy for the next few weeks to allow your body to recover. You may do any activities you feel up to at that point.  Do not drive while taking pain pills prescribed by your doctor. You may drive if taking over-the-counter pain pills.    Call your health care provider if you have any of these symptoms:  You soak a sanitary pad with blood within 1 hour, or you see blood clots larger than a golf ball.  Bleeding that lasts more than 6 weeks.  You have vaginal discharge that smells bad.   A fever of 100.4  F (38  C) or higher (temperature taken under your tongue), with or without chills   Severe, pain, cramping or tenderness in your lower belly area.  Increased pain, swelling, redness or fluid around your stitches.  A more frequent or urgent need to urinate (pee), or it burns when you pee.  Redness, swelling or pain around a vein in your leg.  Problems breastfeeding, or a red or painful area on your breast.  Pain that increases or does not go away from an episiotomy or perineal  tear.  Nausea and vomiting.  Chest pain and cough or are gasping for air.  Problems coping with sadness, anxiety, or depression.   If you have any concerns about hurting yourself or the baby, call your doctor right away.    You have questions or concerns after you return home.    Keep your hands clean:  Always wash your hands before touching your perineal area and stitches.  This helps reduce your risk of infection.  If your hands aren t dirty, you may use an alcohol hand-rub to clean your hands. Keep your nails clean and short.

## 2023-11-29 NOTE — ANESTHESIA POSTPROCEDURE EVALUATION
Patient: Jaxon Dempsey    Procedure: * No procedures listed *       Anesthesia Type:  Epidural    Note:  Disposition: Inpatient   Postop Pain Control: Uneventful            Sign Out: Well controlled pain   PONV: No   Neuro/Psych: Uneventful            Sign Out: Acceptable/Baseline neuro status   Airway/Respiratory: Uneventful            Sign Out: Acceptable/Baseline resp. status   CV/Hemodynamics: Uneventful            Sign Out: Acceptable CV status; No obvious hypovolemia; No obvious fluid overload   Other NRE:    DID A NON-ROUTINE EVENT OCCUR?     Event details/Postop Comments:  S/P epidural for labor. I or my partner remained immediately available, monitored the patient, and supervised nursing staff at key events and necessary intervals.    The patient is doing well. VSS Temp normal. Satisfactory cardiovascular and repiratory function. Neuro at baseline. Denies positional headache. Minimal side effects easily managed w/ PRN meds. No apparent anesthetic complications. No follow-up required.    [email or web address]           Last vitals:  Vitals:    11/28/23 1149 11/28/23 1605 11/28/23 2330   BP: 100/56 100/45 116/56   Pulse: 67  69   Resp: 16 16 16   Temp: 97.7  F (36.5  C) 98.3  F (36.8  C) 98.3  F (36.8  C)   SpO2:          Electronically Signed By: Edd Murrell MD  November 29, 2023  6:20 AM

## 2023-11-29 NOTE — LACTATION NOTE
Lactation in to see patient. Baby at breast at time of visit. Doing breast and bottle feeding. Encouraged to breastfeed first. Reviewed supply and demand. Encouraged to feed every 2-3 hours. Patient has other children. Nipples tender, mother love cream given. Medela pump given for home.

## 2023-11-29 NOTE — PLAN OF CARE
Data: Vital signs within normal limits. Postpartum checks within normal limits - see flow record. Patient eating and drinking normally. Patient able to empty bladder independently and is up ambulating. No apparent signs of infection. Patient performing self cares, is able to care for infant and is breast and bottle feeding every 2-3 hours.   Laceration  appears within normal. Is using Ibuprofen and Tylenol for mild discomfort.    Action: Patient medicated during the shift for pain and cramping. See MAR. Patient education done, see flow record.  Response: Positive attachment behaviors observed with infant. Patient's spouse present this shift.   Plan: Continue current plan of care.  Anticipate discharge on 11/30.

## 2023-11-29 NOTE — PROGRESS NOTES
"OB Post-partum Note  PPD# 1    S:  Patient doing well.  Pain controlled.  Voiding.  Bleeding is normal.  Breast feeding. Still has dry, non productive cough, throat lozenges helped temporarily. Ready for home    O:  /67 (BP Location: Left arm)   Pulse 77   Temp 97.7  F (36.5  C) (Oral)   Resp 20   Ht 1.626 m (5' 4\")   Wt 64 kg (141 lb)   LMP 2023   SpO2 94%   Breastfeeding Unknown   BMI 24.20 kg/m    Gen- A&O, NAD  Abd- Non-tender, fundus non tender and firm   Ext- non-tender, no calf pain  Lungs clear to auscultation    Hemoglobin   Date Value Ref Range Status   2023 12.8 11.7 - 15.7 g/dL Final   2021 12.7 11.7 - 15.7 g/dL Final     A POS  Rubella Immune     A/P: 33 year old  PPD# 0 s/p .  Dry cough without systemic or local signs of infectious process.       1.  Routine post-partum cares  2.  Analgesia with Ibuprofen and/or Tylenol  3.  Discharge today  4.  The plan of care was discussed with the patient.  She expressed understanding and agreement  5.  Prescriptions for Tylenol, Ibuprofen, Colace and cough medicine sent.  6. For persistent cough or worsening of clinical status, may need to seek additional evaluation (urgent care, primary care).   7. Return for 6 week post partum visit.     Lyndon Garcia MD  2023  8:29 AM  "

## 2023-11-29 NOTE — PLAN OF CARE
Discharge instructions completed.  Patient states she understands all discharge instructions and all her questions have been answered.  Verbalizes when she needs to return to clinic for follow up for herself and baby.  She is caring for herself and her baby independently.  Prescriptions reviewed and sent to pharmacy.  Postpartum depression symptoms reviewed and encouraged frequent review of depression scale.

## 2023-12-29 NOTE — ED TRIAGE NOTES
Triage Assessment     Row Name 06/30/23 1839       Triage Assessment (Adult)    Airway WDL WDL       Respiratory WDL    Respiratory WDL WDL       Skin Circulation/Temperature WDL    Skin Circulation/Temperature WDL WDL               0

## 2024-01-09 ENCOUNTER — LAB REQUISITION (OUTPATIENT)
Dept: LAB | Facility: CLINIC | Age: 34
End: 2024-01-09
Payer: COMMERCIAL

## 2024-01-09 DIAGNOSIS — R87.810 CERVICAL HIGH RISK HUMAN PAPILLOMAVIRUS (HPV) DNA TEST POSITIVE: ICD-10-CM

## 2024-01-09 DIAGNOSIS — M79.641 PAIN IN RIGHT HAND: ICD-10-CM

## 2024-01-09 LAB
BASOPHILS # BLD AUTO: 0 10E3/UL (ref 0–0.2)
BASOPHILS NFR BLD AUTO: 1 %
EOSINOPHIL # BLD AUTO: 0.1 10E3/UL (ref 0–0.7)
EOSINOPHIL NFR BLD AUTO: 2 %
ERYTHROCYTE [DISTWIDTH] IN BLOOD BY AUTOMATED COUNT: 11.9 % (ref 10–15)
HCT VFR BLD AUTO: 41.9 % (ref 35–47)
HGB BLD-MCNC: 14.6 G/DL (ref 11.7–15.7)
IMM GRANULOCYTES # BLD: 0 10E3/UL
IMM GRANULOCYTES NFR BLD: 0 %
LYMPHOCYTES # BLD AUTO: 1.9 10E3/UL (ref 0.8–5.3)
LYMPHOCYTES NFR BLD AUTO: 33 %
MCH RBC QN AUTO: 32 PG (ref 26.5–33)
MCHC RBC AUTO-ENTMCNC: 34.8 G/DL (ref 31.5–36.5)
MCV RBC AUTO: 92 FL (ref 78–100)
MONOCYTES # BLD AUTO: 0.4 10E3/UL (ref 0–1.3)
MONOCYTES NFR BLD AUTO: 7 %
NEUTROPHILS # BLD AUTO: 3.3 10E3/UL (ref 1.6–8.3)
NEUTROPHILS NFR BLD AUTO: 57 %
NRBC # BLD AUTO: 0 10E3/UL
NRBC BLD AUTO-RTO: 0 /100
PLATELET # BLD AUTO: 168 10E3/UL (ref 150–450)
RBC # BLD AUTO: 4.56 10E6/UL (ref 3.8–5.2)
RHEUMATOID FACT SERPL-ACNC: <10 IU/ML
WBC # BLD AUTO: 5.7 10E3/UL (ref 4–11)

## 2024-01-09 PROCEDURE — 87624 HPV HI-RISK TYP POOLED RSLT: CPT | Mod: ORL | Performed by: OBSTETRICS & GYNECOLOGY

## 2024-01-09 PROCEDURE — 85025 COMPLETE CBC W/AUTO DIFF WBC: CPT | Mod: ORL | Performed by: OBSTETRICS & GYNECOLOGY

## 2024-01-09 PROCEDURE — 86431 RHEUMATOID FACTOR QUANT: CPT | Mod: ORL | Performed by: OBSTETRICS & GYNECOLOGY

## 2024-01-09 PROCEDURE — G0145 SCR C/V CYTO,THINLAYER,RESCR: HCPCS | Mod: ORL | Performed by: OBSTETRICS & GYNECOLOGY

## 2024-01-11 LAB
BKR LAB AP GYN ADEQUACY: NORMAL
BKR LAB AP GYN INTERPRETATION: NORMAL
BKR LAB AP HPV REFLEX: NORMAL
BKR LAB AP LMP: NORMAL
BKR LAB AP PREVIOUS ABNL DX: NORMAL
BKR LAB AP PREVIOUS ABNORMAL: NORMAL
PATH REPORT.COMMENTS IMP SPEC: NORMAL
PATH REPORT.COMMENTS IMP SPEC: NORMAL
PATH REPORT.RELEVANT HX SPEC: NORMAL

## 2024-01-12 LAB
HUMAN PAPILLOMA VIRUS 16 DNA: NEGATIVE
HUMAN PAPILLOMA VIRUS 18 DNA: NEGATIVE
HUMAN PAPILLOMA VIRUS FINAL DIAGNOSIS: NORMAL
HUMAN PAPILLOMA VIRUS OTHER HR: NEGATIVE

## 2024-08-08 ENCOUNTER — OFFICE VISIT (OUTPATIENT)
Dept: INTERNAL MEDICINE | Facility: CLINIC | Age: 34
End: 2024-08-08
Payer: COMMERCIAL

## 2024-08-08 VITALS
HEIGHT: 62 IN | SYSTOLIC BLOOD PRESSURE: 96 MMHG | RESPIRATION RATE: 16 BRPM | BODY MASS INDEX: 20.8 KG/M2 | DIASTOLIC BLOOD PRESSURE: 60 MMHG | TEMPERATURE: 97.4 F | OXYGEN SATURATION: 100 % | WEIGHT: 113 LBS | HEART RATE: 73 BPM

## 2024-08-08 DIAGNOSIS — M25.562 ACUTE PAIN OF BOTH KNEES: ICD-10-CM

## 2024-08-08 DIAGNOSIS — M79.642 BILATERAL HAND PAIN: Primary | ICD-10-CM

## 2024-08-08 DIAGNOSIS — R21 RASH: ICD-10-CM

## 2024-08-08 DIAGNOSIS — M25.511 ACUTE PAIN OF BOTH SHOULDERS: ICD-10-CM

## 2024-08-08 DIAGNOSIS — M79.641 BILATERAL HAND PAIN: Primary | ICD-10-CM

## 2024-08-08 DIAGNOSIS — M25.512 ACUTE PAIN OF BOTH SHOULDERS: ICD-10-CM

## 2024-08-08 DIAGNOSIS — M25.561 ACUTE PAIN OF BOTH KNEES: ICD-10-CM

## 2024-08-08 DIAGNOSIS — Z30.09 FAMILY PLANNING: ICD-10-CM

## 2024-08-08 PROCEDURE — 99203 OFFICE O/P NEW LOW 30 MIN: CPT

## 2024-08-08 RX ORDER — HYDROCORTISONE 2.5 %
CREAM (GRAM) TOPICAL 2 TIMES DAILY
Qty: 30 G | Refills: 0 | Status: SHIPPED | OUTPATIENT
Start: 2024-08-08

## 2024-08-08 RX ORDER — PRENATAL VIT/IRON FUM/FOLIC AC 27MG-0.8MG
1 TABLET ORAL DAILY
Qty: 90 TABLET | Refills: 0 | Status: SHIPPED | OUTPATIENT
Start: 2024-08-08

## 2024-08-08 NOTE — PROGRESS NOTES
Assessment & Plan     Bilateral hand pain  Patient presents to the clinic for chief complaint of bilateral hand pain.  Patient does have an 8-month-old at home and therefore clinical picture suggest possible de Quervain's versus possible malnourishment.  Patient states that she does not eat 3 meals a day maybe only 1 a day.  Patient's BMI is 20.67 however she is very thin and appears to be frail.  Discussed with her the importance of eating 3 meals a day especially for bone health.  Patient did have quite a bit of cracking with any movement of her wrists.  I will send her to Ortho for further evaluation.  Patient agrees with the plan.  - Orthopedic  Referral; Future    Acute pain of both shoulders  Patient also has pain in both shoulders.  Clinical picture suggest possible strain of the shoulders due to holding babies.  Versus arthritis.  Will send patient to Ortho for further evaluation.    Acute pain of both knees  Patient also reports acute pain of both knees.  Will send patient to Ortho for further evaluation.    Rash  Patient also has a chief complaint of a rash on her hands.  Patient states she would like a hydrocortisone cream to help with the itch.  Will send medication into pharmacy.  - hydrocortisone 2.5 % cream; Apply topically 2 times daily    Family planning  Patient would like to start a prenatal vitamin.  After asking her if she is currently trying to conceive patient said no she takes because she does not eat.  Discussed the importance of her eating as it can waste away her bones and cause an increased risk for fractures.  Patient verbalized understanding.  - Prenatal Vit-Fe Fumarate-FA (PRENATAL MULTIVITAMIN W/IRON) 27-0.8 MG tablet; Take 1 tablet by mouth daily          30 minutes spent by me on the date of the encounter doing chart review, patient visit, and documentation         Dorothea Coates is a 34 year old, presenting for the following health issues:  Patient states she is  "having pain in her hands for the last couple of months. She states she has pain since she had a baby. About 8 months.   Sometimes she cannot open her hands after she has had her hands clenched for awhile.   With movement in her wrists it sounds like cracking.   Patient has 3 children- she knows how to raise them. The second one she was unable to hold due to her hand pain.     She states she want prenatals to help her since she has not been eating much. She only eats one meal a day.       Musculoskeletal Problem      8/8/2024     3:58 PM   Additional Questions   Roomed by Mine GROVE CMA     Musculoskeletal Problem    History of Present Illness       Reason for visit:  Bilateral pain in hands, knees and shoulders  Symptom onset:  More than a month  Symptoms include:  Painful to grasp and ungrasp hands  Symptom intensity:  Moderate  Symptom progression:  Staying the same  Had these symptoms before:  Yes  Has tried/received treatment for these symptoms:  Yes  Previous treatment was successful:  Yes  Prior treatment description:  Doesn't recall, took oral medication  What makes it worse:  Grasping  What makes it better:  No    She eats 0-1 servings of fruits and vegetables daily.She consumes 0 sweetened beverage(s) daily.She exercises with enough effort to increase her heart rate 9 or less minutes per day.  She exercises with enough effort to increase her heart rate 3 or less days per week.   She is taking medications regularly.                 Review of Systems  Constitutional, HEENT, cardiovascular, pulmonary, gi and gu systems are negative, except as otherwise noted.      Objective    BP 96/60 (BP Location: Left arm, Patient Position: Sitting, Cuff Size: Adult Regular)   Pulse 73   Temp 97.4  F (36.3  C) (Tympanic)   Resp 16   Ht 1.575 m (5' 2\")   Wt 51.3 kg (113 lb)   LMP 07/28/2024 (Exact Date)   SpO2 100%   Breastfeeding No   BMI 20.67 kg/m    Body mass index is 20.67 kg/m .  Physical Exam   GENERAL: alert, " frail, and malnourished   EYES: Eyes grossly normal to inspection, PERRL and conjunctivae and sclerae normal  HENT: ear canals and TM's normal, nose and mouth without ulcers or lesions  RESP: lungs clear to auscultation - no rales, rhonchi or wheezes  CV: regular rate and rhythm, normal S1 S2, no S3 or S4, no murmur, click or rub, no peripheral edema   ABDOMEN: soft, nontender, no hepatosplenomegaly, no masses and bowel sounds normal            Signed Electronically by: MIRIAN Garcia CNP

## 2024-08-15 NOTE — PROGRESS NOTES
ASSESSMENT & PLAN    Jaxon was seen today for pain and pain.    Diagnoses and all orders for this visit:    Bilateral carpal tunnel syndrome  -     EMG; Future    Bilateral hand pain  -     Orthopedic  Referral  -     XR Hand Bilateral G/E 3 Views; Future  -     EMG; Future        34 year old female presents with bilateral hand pain on and off for many years as well as weakness and associated numbness and tingling.  History is slightly limited by language barrier.  On exam today, she has positive carpal compression test and fairly generalized hand weakness with both  strength and finger abduction as well as APB weakness consistent with carpal tunnel syndrome.  Also slightly reduced sensation to the median nerve distribution bilaterally.  Given her fairly significant weakness, discussed I would like to order EMG for further evaluation of both carpal tunnel as well as other peripheral neuropathies.     Plan:  -EMG ordered to further evaluate for hand symptoms  -Follow-up after EMG  -Can try night splint in the meantime- wear neutral splints at night   -Consider injections vs surgical referral pending EMG results       Dr. Pascale James, DO, CAQ  [facility]  Sports Medicine     -----  Chief Complaint   Patient presents with    Left Hand - Pain    Right Hand - Pain       SUBJECTIVE  Jaxon Dempsey is a/an 34 year old right-handed female who is seen as a self referral for evaluation of bilateral hand pain. Onset was 10 years ago then came back in 2021 when she was pregnant. The pain went away when the baby was 7 months old. Then she was pregnant again and the pain came back last year and now the baby is 8 months old and the pain is not going away. Pain is located generally throughout the hand. Symptoms are worsened by making a fist, grasping, lifting.  She has tried massaging, heat pack, Tylenol and Ibuprofen. Associated symptoms include occasional numbness/tingling, she  "had stabbing pain and swelling when she is breastfeeding.     The patient is seen alone, history is obtained with help of an     Prior injury/Surgical history of affected joint: N/A      REVIEW OF SYSTEMS:  Pertinent positives/negative: As stated above in HPI    OBJECTIVE:  BP 93/63   Ht 1.575 m (5' 2\")   Wt 51.3 kg (113 lb)   LMP 07/28/2024 (Exact Date)   BMI 20.67 kg/m     General: Alert and in no distress  CV: Extremities appear well perfused   Resp: normal respiratory effort  MSK:  Bilateral hand exam  Bilateral  strength weakness, weakness with finger abduction bilaterally, APB weakness bilaterally.  Full range of motion bilateral wrist, no tenderness to palpation  Positive carpal compression test bilaterally  Negative Finkelstein bilaterally  Sensation slightly with nerve distribution bilaterally to light touch    RADIOLOGY:  Final results and radiologist's interpretation available in the [facility] health record.  Images below were personally reviewed and discussed with the patient in the office today.  My personal interpretation of the performed imaging: X-ray of bilateral hands performed in clinic today reveal no acute osseous abnormalities, no degenerative changes no fractures                 Disclaimer: This note consists of text and symbols derived from dictation and/or voice recognition software. As a result, there may be errors in the script that have gone undetected. Please consider this when interpreting information found in this chart.    "

## 2024-08-16 ENCOUNTER — ANCILLARY PROCEDURE (OUTPATIENT)
Dept: GENERAL RADIOLOGY | Facility: CLINIC | Age: 34
End: 2024-08-16
Attending: STUDENT IN AN ORGANIZED HEALTH CARE EDUCATION/TRAINING PROGRAM
Payer: COMMERCIAL

## 2024-08-16 ENCOUNTER — OFFICE VISIT (OUTPATIENT)
Dept: ORTHOPEDICS | Facility: CLINIC | Age: 34
End: 2024-08-16
Payer: COMMERCIAL

## 2024-08-16 VITALS
HEIGHT: 62 IN | SYSTOLIC BLOOD PRESSURE: 93 MMHG | BODY MASS INDEX: 20.8 KG/M2 | WEIGHT: 113 LBS | DIASTOLIC BLOOD PRESSURE: 63 MMHG

## 2024-08-16 DIAGNOSIS — M79.642 BILATERAL HAND PAIN: ICD-10-CM

## 2024-08-16 DIAGNOSIS — G56.03 BILATERAL CARPAL TUNNEL SYNDROME: Primary | ICD-10-CM

## 2024-08-16 DIAGNOSIS — M79.641 BILATERAL HAND PAIN: ICD-10-CM

## 2024-08-16 PROCEDURE — T1013 SIGN LANG/ORAL INTERPRETER: HCPCS | Mod: U4

## 2024-08-16 PROCEDURE — 99204 OFFICE O/P NEW MOD 45 MIN: CPT | Performed by: STUDENT IN AN ORGANIZED HEALTH CARE EDUCATION/TRAINING PROGRAM

## 2024-08-16 PROCEDURE — 73130 X-RAY EXAM OF HAND: CPT | Mod: TC | Performed by: RADIOLOGY

## 2024-08-16 NOTE — PATIENT INSTRUCTIONS
1. Bilateral carpal tunnel syndrome    2. Bilateral hand pain        Plan:  -EMG ordered to further evaluate for hand symptoms  -Follow-up after EMG  -Can try night splint in the meantime- wear neutral splints at night   -Consider injections vs surgical referral pending EMG results     If you have questions/concerns after your appointment, please send my team a [employer] message or call the clinic at [phone]. If you had imaging performed at your appointment today, you can expect to see your results in [employer] within approximately 48 business hours.     Dr. Pascale James DO, [facility]  Sports Medicine and Orthopedics    Dr. James's Clinic Locations:   [place] APPOINTMENTS: [phone]      [facility] RADIOLOGY: [phone]   [place] [address] PHYSICAL THERAPY: [phone]    HAND THERAPY/OT: [phone]   [place] BILLING QUESTIONS: [phone] [address] FAX: [phone]   [place] [address]

## 2024-08-16 NOTE — LETTER
8/16/2024      Jaxon Dempsey  [address]  [facility] [address]      Dear Colleague,    Thank you for referring your patient, Jaxon Dempsey, to the [facility] SPORTS [facility]IC[facility] [place]. Please see a copy of my visit note below.    ASSESSMENT & PLAN    Jaxon was seen today for pain and pain.    Diagnoses and all orders for this visit:    Bilateral carpal tunnel syndrome  -     EMG; Future    Bilateral hand pain  -     Orthopedic  Referral  -     XR Hand Bilateral G/E 3 Views; Future  -     EMG; Future        34 year old female presents with bilateral hand pain on and off for many years as well as weakness and associated numbness and tingling.  History is slightly limited by language barrier.  On exam today, she has positive carpal compression test and fairly generalized hand weakness with both  strength and finger abduction as well as APB weakness consistent with carpal tunnel syndrome.  Also slightly reduced sensation to the median nerve distribution bilaterally.  Given her fairly significant weakness, discussed I would like to order EMG for further evaluation of both carpal tunnel as well as other peripheral neuropathies.     Plan:  -EMG ordered to further evaluate for hand symptoms  -Follow-up after EMG  -Can try night splint in the meantime- wear neutral splints at night   -Consider injections vs surgical referral pending EMG results       Dr. Pascale James, DO, CAQ  [facility]  Sports Medicine     -----  Chief Complaint   Patient presents with     Left Hand - Pain     Right Hand - Pain       SUBJECTIVE  Jaxon Dempsey is a/an 34 year old right-handed female who is seen as a self referral for evaluation of bilateral hand pain. Onset was 10 years ago then came back in 2021 when she was pregnant. The pain went away when the baby was 7 months old. Then she was pregnant again and the pain came back last year and now the baby is 8 months  "old and the pain is not going away. Pain is located generally throughout the hand. Symptoms are worsened by making a fist, grasping, lifting.  She has tried massaging, heat pack, Tylenol and Ibuprofen. Associated symptoms include occasional numbness/tingling, she had stabbing pain and swelling when she is breastfeeding.     The patient is seen alone, history is obtained with help of an     Prior injury/Surgical history of affected joint: N/A      REVIEW OF SYSTEMS:  Pertinent positives/negative: As stated above in HPI    OBJECTIVE:  BP 93/63   Ht 1.575 m (5' 2\")   Wt 51.3 kg (113 lb)   LMP 07/28/2024 (Exact Date)   BMI 20.67 kg/m     General: Alert and in no distress  CV: Extremities appear well perfused   Resp: normal respiratory effort  MSK:  Bilateral hand exam  Bilateral  strength weakness, weakness with finger abduction bilaterally, APB weakness bilaterally.  Full range of motion bilateral wrist, no tenderness to palpation  Positive carpal compression test bilaterally  Negative Finkelstein bilaterally  Sensation slightly with nerve distribution bilaterally to light touch    RADIOLOGY:  Final results and radiologist's interpretation available in the [facility] health record.  Images below were personally reviewed and discussed with the patient in the office today.  My personal interpretation of the performed imaging: X-ray of bilateral hands performed in clinic today reveal no acute osseous abnormalities, no degenerative changes no fractures                 Disclaimer: This note consists of text and symbols derived from dictation and/or voice recognition software. As a result, there may be errors in the script that have gone undetected. Please consider this when interpreting information found in this chart.        Again, thank you for allowing me to participate in the care of your patient.        Sincerely,        Pascale James, DO  "

## 2024-08-20 ENCOUNTER — OFFICE VISIT (OUTPATIENT)
Dept: NEUROLOGY | Facility: CLINIC | Age: 34
End: 2024-08-20
Attending: STUDENT IN AN ORGANIZED HEALTH CARE EDUCATION/TRAINING PROGRAM
Payer: COMMERCIAL

## 2024-08-20 DIAGNOSIS — G56.03 BILATERAL CARPAL TUNNEL SYNDROME: ICD-10-CM

## 2024-08-20 DIAGNOSIS — M79.642 BILATERAL HAND PAIN: ICD-10-CM

## 2024-08-20 DIAGNOSIS — M79.641 BILATERAL HAND PAIN: ICD-10-CM

## 2024-08-20 PROCEDURE — 95910 NRV CNDJ TEST 7-8 STUDIES: CPT | Performed by: PSYCHIATRY & NEUROLOGY

## 2024-08-20 PROCEDURE — 95885 MUSC TST DONE W/NERV TST LIM: CPT | Performed by: PSYCHIATRY & NEUROLOGY

## 2024-08-20 NOTE — LETTER
2024      Jaxon Dempsey  [address]  [facility] [address]      Dear Colleague,    Thank you for referring your patient, Jaxon Dempsey, to the [facility] NEUROLOGY CLINICS [facility]. Please see a copy of my visit note below.                        [facility]  Electrodiagnostic Laboratory                 Department of Neurology                                                                                                         Test Date:  2024    Patient: Ke Coates : 1990 Physician: Blake Melton MD   Sex: Female AGE: 34 year Ref Phys: Pascale JamesDO   ID#: [id number]   Technician:      History and Examination:    34-year-old woman who recently gave birth to a term infant (8 months ago) and complains of bilateral hand pain, and possible numbness/tingling.  Clinical exam today did not show any definite weakness of the APB, FDI, or long finger flexors.  However she shows delayed relaxation of the hand  after making a fist.  EMG was requested to evaluate for carpal tunnel syndrome or other neuromuscular explanation for chief complaint.    Techniques:    Mot and sensory nerve or conduction studies were done with surface recording electrodes. Temperature was monitored and recorded throughout the study. Upper extremities were maintained at a temperature of 32 degrees Centigrade or higher.  EMG was done with a concentric needle electrode.     Results:    Bilateral median (APB), and ulnar (ADM) motor nerve conduction studies were normal.  Bilateral median and ulnar antidromic sensory nerve conduction studies were normal.  Right median to ulnar orthodromic (palmar) mixed nerve conduction studies were normal.  Needle EMG of the right FDI, pronator teres, and EDC was normal.    Interpretation:    Normal study.  No electrodiagnostic evidence of carpal tunnel syndrome, or ulnar neuropathy on either side.  No electrodiagnostic evidence of myotonic  discharges at the right upper extremity, to explain delayed release of handgrip.    ___________________________  Blake Melton MD        Nerve Conduction Studies  Motor Sites      Latency Neg. Amp Neg. Amp Diff Segment Distance Velocity Neg. Dur Neg Area Diff Temperature Comment   Site (ms) Norm (mV) Norm (%)  cm m/s Norm (ms) (%) ( C)    Left Median (APB) Motor   Wrist 3.4  < 4.4 13.1  > 5.0  Wrist-APB 8   5.5  21.6    Elbow 6.6 - 12.2  > 5.0 -7 Elbow-Wrist 19 59  > 48 5.2 -13 21.6    Right Median (APB) Motor   Wrist 3.6  < 4.4 14.5  > 5.0  Wrist-APB 8   5.3  22.9    Elbow 6.7 - 14.0  > 5.0 -3 Elbow-Wrist 17.5 56  > 48 5.4 -4 22.9    Left Ulnar (ADM) Motor   Wrist 2.6  < 3.5 6.1  > 5.0  Wrist-ADM 8   5.5  21.6    Below Elbow 5.0 - 5.7 - -7 Below Elbow-Wrist 15 63  > 48 5.8 6 21.5    Above Elbow 6.4 - 6.1 - 7 Above Elbow-Below Elbow 8 57  > 48 6.0 -6 21.5    Right Ulnar (ADM) Motor   Wrist 3.0  < 3.5 11.7  > 5.0  Wrist-ADM 8   5.2  22.9    Below Elbow 5.8 - 11.4 - -3 Below Elbow-Wrist 17.8 64  > 48 5.3 -6 22.9    Above Elbow 7.1 - 10.4 - -9 Above Elbow-Below Elbow 8 62  > 48 5.6 -8 22.8      Sensory Sites      Onset Lat Peak Lat Amp (O-P) Amp (P-P) Segment Distance Velocity Temperature Comment   Site ms (ms)  V Norm ( V)  cm m/s Norm ( C)    Left Median Sensory   Wrist-Dig II 2.7 3.4 72  > 10 102 Wrist-Dig II 14 52  > 48 21.7    Right Median Sensory   Wrist-Dig II 2.5 3.1 63  > 10 84 Wrist-Dig II 14 56  > 48 22    Right Median-Ulnar Palmar Sensory        Median   Palm-Wrist 1.50 1.98 209 - 245 Palm-Wrist - - - 22.4         Ulnar   Palm-Wrist 1.30 1.83 91 - 103 Palm-Wrist - - - 22.2    Left Ulnar Sensory   Wrist-Dig V 2.4 3.1 73  > 8 112 Wrist-Dig V 12.5 52  > 48 21.7    Right Ulnar Sensory   Wrist-Dig V 2.3 2.9 54  > 8 77 Wrist-Dig V 12.5 54  > 48 21.9      Inter-Nerve Comparisons     Nerve 1 Value 1 Nerve 2 Value 2 Parameter Result Normal   Sensory Sites   R Median Palm-Wrist 1.98 ms R Ulnar Palm-Wrist  1.83 ms Peak Lat Diff 0.15 ms <0.40       Electromyography     Side Muscle Ins Act Fibs/PSW Fasc HF Amp Dur Poly Recrt Int Pat   Right FDI Nml None Nml 0 Nml Nml 0 Nml Nml   Right Pronator Teres Nml None Nml 0 Nml Nml 0 Nml Nml   Right EDC Nml None Nml 0 Nml Nml 0 Nml Nml         NCS Waveforms:    Motor                Sensory                    Ultrasound Images:            Again, thank you for allowing me to participate in the care of your patient.        Sincerely,        Blake Melton MD

## 2024-08-20 NOTE — PROGRESS NOTES
[facility]  Electrodiagnostic Laboratory                 Department of Neurology                                                                                                         Test Date:  2024    Patient: Ke Coates : 1990 Physician: Blake Melton MD   Sex: Female AGE: 34 year Ref Phys: Pascale James DO   ID#: [id number]   Technician:      History and Examination:    34-year-old woman who recently gave birth to a term infant (8 months ago) and complains of bilateral hand pain, and possible numbness/tingling.  Clinical exam today did not show any definite weakness of the APB, FDI, or long finger flexors.  However she shows delayed relaxation of the hand  after making a fist.  EMG was requested to evaluate for carpal tunnel syndrome or other neuromuscular explanation for chief complaint.    Techniques:    Mot and sensory nerve or conduction studies were done with surface recording electrodes. Temperature was monitored and recorded throughout the study. Upper extremities were maintained at a temperature of 32 degrees Centigrade or higher.  EMG was done with a concentric needle electrode.     Results:    Bilateral median (APB), and ulnar (ADM) motor nerve conduction studies were normal.  Bilateral median and ulnar antidromic sensory nerve conduction studies were normal.  Right median to ulnar orthodromic (palmar) mixed nerve conduction studies were normal.  Needle EMG of the right FDI, pronator teres, and EDC was normal.    Interpretation:    Normal study.  No electrodiagnostic evidence of carpal tunnel syndrome, or ulnar neuropathy on either side.  No electrodiagnostic evidence of myotonic discharges at the right upper extremity, to explain delayed release of handgrip.    ___________________________  Blake Melton MD        Nerve Conduction Studies  Motor Sites      Latency Neg. Amp Neg. Amp Diff Segment Distance Velocity Neg. Dur Neg  Area Diff Temperature Comment   Site (ms) Norm (mV) Norm (%)  cm m/s Norm (ms) (%) ( C)    Left Median (APB) Motor   Wrist 3.4  < 4.4 13.1  > 5.0  Wrist-APB 8   5.5  21.6    Elbow 6.6 - 12.2  > 5.0 -7 Elbow-Wrist 19 59  > 48 5.2 -13 21.6    Right Median (APB) Motor   Wrist 3.6  < 4.4 14.5  > 5.0  Wrist-APB 8   5.3  22.9    Elbow 6.7 - 14.0  > 5.0 -3 Elbow-Wrist 17.5 56  > 48 5.4 -4 22.9    Left Ulnar (ADM) Motor   Wrist 2.6  < 3.5 6.1  > 5.0  Wrist-ADM 8   5.5  21.6    Below Elbow 5.0 - 5.7 - -7 Below Elbow-Wrist 15 63  > 48 5.8 6 21.5    Above Elbow 6.4 - 6.1 - 7 Above Elbow-Below Elbow 8 57  > 48 6.0 -6 21.5    Right Ulnar (ADM) Motor   Wrist 3.0  < 3.5 11.7  > 5.0  Wrist-ADM 8   5.2  22.9    Below Elbow 5.8 - 11.4 - -3 Below Elbow-Wrist 17.8 64  > 48 5.3 -6 22.9    Above Elbow 7.1 - 10.4 - -9 Above Elbow-Below Elbow 8 62  > 48 5.6 -8 22.8      Sensory Sites      Onset Lat Peak Lat Amp (O-P) Amp (P-P) Segment Distance Velocity Temperature Comment   Site ms (ms)  V Norm ( V)  cm m/s Norm ( C)    Left Median Sensory   Wrist-Dig II 2.7 3.4 72  > 10 102 Wrist-Dig II 14 52  > 48 21.7    Right Median Sensory   Wrist-Dig II 2.5 3.1 63  > 10 84 Wrist-Dig II 14 56  > 48 22    Right Median-Ulnar Palmar Sensory        Median   Palm-Wrist 1.50 1.98 209 - 245 Palm-Wrist - - - 22.4         Ulnar   Palm-Wrist 1.30 1.83 91 - 103 Palm-Wrist - - - 22.2    Left Ulnar Sensory   Wrist-Dig V 2.4 3.1 73  > 8 112 Wrist-Dig V 12.5 52  > 48 21.7    Right Ulnar Sensory   Wrist-Dig V 2.3 2.9 54  > 8 77 Wrist-Dig V 12.5 54  > 48 21.9      Inter-Nerve Comparisons     Nerve 1 Value 1 Nerve 2 Value 2 Parameter Result Normal   Sensory Sites   R Median Palm-Wrist 1.98 ms R Ulnar Palm-Wrist 1.83 ms Peak Lat Diff 0.15 ms <0.40       Electromyography     Side Muscle Ins Act Fibs/PSW Fasc HF Amp Dur Poly Recrt Int Pat   Right FDI Nml None Nml 0 Nml Nml 0 Nml Nml   Right Pronator Teres Nml None Nml 0 Nml Nml 0 Nml Nml   Right EDC Nml None Nml 0 Nml Nml  0 Nml Nml         NCS Waveforms:    Motor                Sensory                    Ultrasound Images:

## 2024-08-23 ENCOUNTER — TELEPHONE (OUTPATIENT)
Dept: ORTHOPEDICS | Facility: CLINIC | Age: 34
End: 2024-08-23
Payer: COMMERCIAL

## 2024-08-23 NOTE — TELEPHONE ENCOUNTER
Attempted to call patient with the help of . No consent to communicate on file. Left a short voicemail stating the desire to go inform her of EMG results per Dr. James's request, and left the scheduling number. The  repeated this message in patient's language, as well as the phone number.The patient currently has a future appointment with Dr. James scheduled for 9/5/24 as a hand/wrist follow up.    January Gandhi, AKUA, ATC, LAT

## 2024-09-19 ENCOUNTER — OFFICE VISIT (OUTPATIENT)
Dept: ORTHOPEDICS | Facility: CLINIC | Age: 34
End: 2024-09-19
Payer: COMMERCIAL

## 2024-09-19 VITALS — BODY MASS INDEX: 20.8 KG/M2 | HEIGHT: 62 IN | WEIGHT: 113 LBS

## 2024-09-19 DIAGNOSIS — M65.341 TRIGGER FINGER, RIGHT RING FINGER: ICD-10-CM

## 2024-09-19 DIAGNOSIS — M65.949 FLEXOR TENOSYNOVITIS OF FINGER: Primary | ICD-10-CM

## 2024-09-19 DIAGNOSIS — M79.641 BILATERAL HAND PAIN: ICD-10-CM

## 2024-09-19 DIAGNOSIS — M79.642 BILATERAL HAND PAIN: ICD-10-CM

## 2024-09-19 PROCEDURE — 99213 OFFICE O/P EST LOW 20 MIN: CPT | Performed by: STUDENT IN AN ORGANIZED HEALTH CARE EDUCATION/TRAINING PROGRAM

## 2024-09-19 NOTE — PATIENT INSTRUCTIONS
1. Flexor tenosynovitis of finger    2. Bilateral hand pain    3. Trigger finger, right ring finger        Plan:  -Provided with figure 8 splints to right 3rd and 4th fingers. Wear at night and as needed during day  -Voltaren gel over areas of pain up to 4 times daily as needed   -If no improvement, plan to return for flexor tendon sheath injections at any time  -For tingling, consider referral to neurology given negative EMG findings, patient declines today    If you had imaging performed/ordered at your appointment today, you can expect to see your radiology results in [employer] within approximately 48 business hours.     If you have questions/concerns after your appointment, please send my team a [employer] message or call the clinic at [phone].     Dr. Pascale James, , [facility]  Sports Medicine and Orthopedics    Dr. James's Clinic Locations and Contact Numbers:   [place] APPOINTMENTS: [phone]      [address] [facility] RADIOLOGY: [phone]   [place] [address] PHYSICAL THERAPY: [phone]    HAND THERAPY/OT: [phone]   [place] BILLING QUESTIONS: [phone] [address] FAX: [phone]   [place] [address]

## 2024-09-19 NOTE — LETTER
9/19/2024      Jaxon Dempsey  [address]  [facility] [address]      Dear Colleague,    Thank you for referring your patient, Jaxon Dempsey, to the [facility] [place]. Please see a copy of my visit note below.    ASSESSMENT & PLAN    Jaxon was seen today for recheck and recheck.    Diagnoses and all orders for this visit:    Flexor tenosynovitis of finger  -     diclofenac (VOLTAREN) 1 % topical gel; Apply 2 g topically 4 times daily.    Bilateral hand pain    Trigger finger, right ring finger  -     diclofenac (VOLTAREN) 1 % topical gel; Apply 2 g topically 4 times daily.        34-year-old [place] speaking female presents to follow-up on bilateral hand numbness and tingling.  We discussed that her EMG was unremarkable and she actually reports her left hand has improved and her right hand continues to have some tingling but primary complaint is pain of the primarily third and fourth digit.  She has witnessed triggering of the fourth digit on exam today and significant tenderness to palpation over the flexor tendons at the A1 pulley of both the third and fourth digits.  She would like to hold off an injection today and we will proceed with with plan as follows:        Plan:  -Provided with figure 8 splints to right 3rd and 4th fingers. Wear at night and as needed during day  -Voltaren gel over areas of pain up to 4 times daily as needed   -If no improvement, plan to return for flexor tendon sheath injections at any time  -For tingling, consider referral to neurology given negative EMG findings, patient declines today        Dr. Pascale James, DO  [facility]  Sports Medicine     -----  Chief Complaint   Patient presents with     Left Hand - RECHECK     Right Hand - RECHECK       SUBJECTIVE  Jaxon Dempsey is a/an 34 year old female who is seen to follow-up on bilateral hand pain. The patient was last seen 08/16/24. Since last visit,  "patient obtained an EMG. They report their pain and symptoms have improved, especially in the left hand. They report utilizing massage. Denies any finger swelling. Right hand continues to bother her, especially 3rd digit. Reports it was initially numb, but now reports numbness has somewhat improved and is having pain.     They report numbness and tingling and weakness of their left hands.     The patient is seen alone. History taken with assistance of .       REVIEW OF SYSTEMS:  Pertinent positives/negative: As stated above in HPI    OBJECTIVE:  Ht 1.575 m (5' 2\")   Wt 51.3 kg (113 lb)   LMP 07/28/2024 (Exact Date)   BMI 20.67 kg/m     General: Alert and in no distress  Skin: no visable rashes  CV: Extremities appear well perfused   Resp: normal respiratory effort, no conversational dyspnea   Psych: normal mood, affect  MSK:  Right hand exam  Tenderness to palpation of the flexor tendons of the third and fourth digit at the level of the A1 pulley  Witnessed triggering of the right fourth digit  Full range of motion of the fingers to both flexion and extension  No swelling or redness of the hand or fingers    RADIOLOGY:  Final results and radiologist's interpretation available in the [facility] health record.  Images below were personally reviewed and discussed with the patient in the office today.  My personal interpretation of the performed imaging: No new imaging  EMG from 8/20/2024 reviewed and has normal findings and both nerve conduction study and EMG parts of the exam                 Disclaimer: This note consists of symbols derived from dictation and/or voice recognition software. As a result, there may be errors in the script that have gone undetected. Please consider this when interpreting information found in this chart.        Again, thank you for allowing me to participate in the care of your patient.        Sincerely,        Pascale James, DO  "

## 2024-09-19 NOTE — PROGRESS NOTES
ASSESSMENT & PLAN    Jaxon was seen today for recheck and recheck.    Diagnoses and all orders for this visit:    Flexor tenosynovitis of finger  -     diclofenac (VOLTAREN) 1 % topical gel; Apply 2 g topically 4 times daily.    Bilateral hand pain    Trigger finger, right ring finger  -     diclofenac (VOLTAREN) 1 % topical gel; Apply 2 g topically 4 times daily.        34-year-old Citizen of [place] speaking female presents to follow-up on bilateral hand numbness and tingling.  We discussed that her EMG was unremarkable and she actually reports her left hand has improved and her right hand continues to have some tingling but primary complaint is pain of the primarily third and fourth digit.  She has witnessed triggering of the fourth digit on exam today and significant tenderness to palpation over the flexor tendons at the A1 pulley of both the third and fourth digits.  She would like to hold off an injection today and we will proceed with with plan as follows:        Plan:  -Provided with figure 8 splints to right 3rd and 4th fingers. Wear at night and as needed during day  -Voltaren gel over areas of pain up to 4 times daily as needed   -If no improvement, plan to return for flexor tendon sheath injections at any time  -For tingling, consider referral to neurology given negative EMG findings, patient declines today        Dr. Pascale James, DO  [facility]  Sports Medicine     -----  Chief Complaint   Patient presents with    Left Hand - RECHECK    Right Hand - RECHECK       SUBJECTIVE  Jaxon Dempsey is a/an 34 year old female who is seen to follow-up on bilateral hand pain. The patient was last seen 08/16/24. Since last visit, patient obtained an EMG. They report their pain and symptoms have improved, especially in the left hand. They report utilizing massage. Denies any finger swelling. Right hand continues to bother her, especially 3rd digit. Reports it was initially numb, but now  "reports numbness has somewhat improved and is having pain.     They report numbness and tingling and weakness of their left hands.     The patient is seen alone. History taken with assistance of .       REVIEW OF SYSTEMS:  Pertinent positives/negative: As stated above in HPI    OBJECTIVE:  Ht 1.575 m (5' 2\")   Wt 51.3 kg (113 lb)   LMP 07/28/2024 (Exact Date)   BMI 20.67 kg/m     General: Alert and in no distress  Skin: no visable rashes  CV: Extremities appear well perfused   Resp: normal respiratory effort, no conversational dyspnea   Psych: normal mood, affect  MSK:  Right hand exam  Tenderness to palpation of the flexor tendons of the third and fourth digit at the level of the A1 pulley  Witnessed triggering of the right fourth digit  Full range of motion of the fingers to both flexion and extension  No swelling or redness of the hand or fingers    RADIOLOGY:  Final results and radiologist's interpretation available in the [facility] health record.  Images below were personally reviewed and discussed with the patient in the office today.  My personal interpretation of the performed imaging: No new imaging  EMG from 8/20/2024 reviewed and has normal findings and both nerve conduction study and EMG parts of the exam                 Disclaimer: This note consists of symbols derived from dictation and/or voice recognition software. As a result, there may be errors in the script that have gone undetected. Please consider this when interpreting information found in this chart.    "

## 2024-10-29 ENCOUNTER — LAB REQUISITION (OUTPATIENT)
Dept: LAB | Facility: CLINIC | Age: 34
End: 2024-10-29

## 2024-10-29 DIAGNOSIS — R30.0 DYSURIA: ICD-10-CM

## 2024-10-29 PROCEDURE — 87491 CHLMYD TRACH DNA AMP PROBE: CPT | Performed by: ADVANCED PRACTICE MIDWIFE

## 2024-10-29 PROCEDURE — 87086 URINE CULTURE/COLONY COUNT: CPT | Performed by: ADVANCED PRACTICE MIDWIFE

## 2024-10-30 LAB
BACTERIA UR CULT: NO GROWTH
C TRACH DNA SPEC QL PROBE+SIG AMP: NEGATIVE
N GONORRHOEA DNA SPEC QL NAA+PROBE: NEGATIVE

## 2025-02-27 ENCOUNTER — HOSPITAL ENCOUNTER (EMERGENCY)
Facility: CLINIC | Age: 35
End: 2025-02-27
Payer: COMMERCIAL

## 2025-03-13 ENCOUNTER — LAB REQUISITION (OUTPATIENT)
Dept: LAB | Facility: CLINIC | Age: 35
End: 2025-03-13
Payer: COMMERCIAL

## 2025-03-13 ENCOUNTER — APPOINTMENT (OUTPATIENT)
Dept: ULTRASOUND IMAGING | Facility: CLINIC | Age: 35
End: 2025-03-13
Attending: STUDENT IN AN ORGANIZED HEALTH CARE EDUCATION/TRAINING PROGRAM
Payer: COMMERCIAL

## 2025-03-13 ENCOUNTER — HOSPITAL ENCOUNTER (EMERGENCY)
Facility: CLINIC | Age: 35
Discharge: HOME | End: 2025-03-13
Attending: STUDENT IN AN ORGANIZED HEALTH CARE EDUCATION/TRAINING PROGRAM
Payer: COMMERCIAL

## 2025-03-13 VITALS
HEIGHT: 64 IN | HEART RATE: 70 BPM | TEMPERATURE: 97 F | BODY MASS INDEX: 17.77 KG/M2 | WEIGHT: 104.06 LBS | RESPIRATION RATE: 18 BRPM | SYSTOLIC BLOOD PRESSURE: 95 MMHG | DIASTOLIC BLOOD PRESSURE: 58 MMHG | OXYGEN SATURATION: 100 %

## 2025-03-13 DIAGNOSIS — O21.9 NAUSEA/VOMITING IN PREGNANCY: ICD-10-CM

## 2025-03-13 DIAGNOSIS — O26.891 ABDOMINAL PAIN DURING PREGNANCY IN FIRST TRIMESTER: ICD-10-CM

## 2025-03-13 DIAGNOSIS — Z34.90 ENCOUNTER FOR SUPERVISION OF NORMAL PREGNANCY, UNSPECIFIED, UNSPECIFIED TRIMESTER: ICD-10-CM

## 2025-03-13 DIAGNOSIS — R10.9 ABDOMINAL PAIN DURING PREGNANCY IN FIRST TRIMESTER: ICD-10-CM

## 2025-03-13 LAB
ALBUMIN SERPL BCG-MCNC: 4.1 G/DL (ref 3.5–5.2)
ALP SERPL-CCNC: 42 U/L (ref 40–150)
ALT SERPL W P-5'-P-CCNC: 11 U/L (ref 0–50)
ANION GAP SERPL CALCULATED.3IONS-SCNC: 11 MMOL/L (ref 7–15)
AST SERPL W P-5'-P-CCNC: 15 U/L (ref 0–45)
BASOPHILS # BLD AUTO: 0 10E3/UL (ref 0–0.2)
BASOPHILS NFR BLD AUTO: 1 %
BILIRUB SERPL-MCNC: 1.1 MG/DL
BUN SERPL-MCNC: 9.3 MG/DL (ref 6–20)
CALCIUM SERPL-MCNC: 8.6 MG/DL (ref 8.8–10.4)
CHLORIDE SERPL-SCNC: 105 MMOL/L (ref 98–107)
CREAT SERPL-MCNC: 0.42 MG/DL (ref 0.51–0.95)
EGFRCR SERPLBLD CKD-EPI 2021: >90 ML/MIN/1.73M2
EOSINOPHIL # BLD AUTO: 0.1 10E3/UL (ref 0–0.7)
EOSINOPHIL NFR BLD AUTO: 3 %
ERYTHROCYTE [DISTWIDTH] IN BLOOD BY AUTOMATED COUNT: 12.3 % (ref 10–15)
GLUCOSE SERPL-MCNC: 93 MG/DL (ref 70–99)
HCG INTACT+B SERPL-ACNC: ABNORMAL MIU/ML
HCO3 SERPL-SCNC: 20 MMOL/L (ref 22–29)
HCT VFR BLD AUTO: 35.5 % (ref 35–47)
HGB BLD-MCNC: 12.8 G/DL (ref 11.7–15.7)
IMM GRANULOCYTES # BLD: 0 10E3/UL
IMM GRANULOCYTES NFR BLD: 0 %
LIPASE SERPL-CCNC: 22 U/L (ref 13–60)
LYMPHOCYTES # BLD AUTO: 1.5 10E3/UL (ref 0.8–5.3)
LYMPHOCYTES NFR BLD AUTO: 27 %
MCH RBC QN AUTO: 32.4 PG (ref 26.5–33)
MCHC RBC AUTO-ENTMCNC: 36.1 G/DL (ref 31.5–36.5)
MCV RBC AUTO: 90 FL (ref 78–100)
MONOCYTES # BLD AUTO: 0.5 10E3/UL (ref 0–1.3)
MONOCYTES NFR BLD AUTO: 8 %
NEUTROPHILS # BLD AUTO: 3.4 10E3/UL (ref 1.6–8.3)
NEUTROPHILS NFR BLD AUTO: 61 %
NRBC # BLD AUTO: 0 10E3/UL
NRBC BLD AUTO-RTO: 0 /100
PLATELET # BLD AUTO: 240 10E3/UL (ref 150–450)
POTASSIUM SERPL-SCNC: 3.4 MMOL/L (ref 3.4–5.3)
PROT SERPL-MCNC: 6.3 G/DL (ref 6.4–8.3)
RBC # BLD AUTO: 3.95 10E6/UL (ref 3.8–5.2)
SODIUM SERPL-SCNC: 136 MMOL/L (ref 135–145)
WBC # BLD AUTO: 5.5 10E3/UL (ref 4–11)

## 2025-03-13 PROCEDURE — 76705 ECHO EXAM OF ABDOMEN: CPT

## 2025-03-13 PROCEDURE — 99284 EMERGENCY DEPT VISIT MOD MDM: CPT | Mod: 25

## 2025-03-13 PROCEDURE — 82565 ASSAY OF CREATININE: CPT | Performed by: STUDENT IN AN ORGANIZED HEALTH CARE EDUCATION/TRAINING PROGRAM

## 2025-03-13 PROCEDURE — 83690 ASSAY OF LIPASE: CPT | Performed by: STUDENT IN AN ORGANIZED HEALTH CARE EDUCATION/TRAINING PROGRAM

## 2025-03-13 PROCEDURE — 84460 ALANINE AMINO (ALT) (SGPT): CPT | Performed by: STUDENT IN AN ORGANIZED HEALTH CARE EDUCATION/TRAINING PROGRAM

## 2025-03-13 PROCEDURE — 82435 ASSAY OF BLOOD CHLORIDE: CPT | Performed by: STUDENT IN AN ORGANIZED HEALTH CARE EDUCATION/TRAINING PROGRAM

## 2025-03-13 PROCEDURE — 84702 CHORIONIC GONADOTROPIN TEST: CPT | Performed by: STUDENT IN AN ORGANIZED HEALTH CARE EDUCATION/TRAINING PROGRAM

## 2025-03-13 PROCEDURE — 76801 OB US < 14 WKS SINGLE FETUS: CPT

## 2025-03-13 PROCEDURE — 36415 COLL VENOUS BLD VENIPUNCTURE: CPT | Performed by: STUDENT IN AN ORGANIZED HEALTH CARE EDUCATION/TRAINING PROGRAM

## 2025-03-13 PROCEDURE — 84702 CHORIONIC GONADOTROPIN TEST: CPT | Performed by: ADVANCED PRACTICE MIDWIFE

## 2025-03-13 PROCEDURE — 85025 COMPLETE CBC W/AUTO DIFF WBC: CPT | Performed by: STUDENT IN AN ORGANIZED HEALTH CARE EDUCATION/TRAINING PROGRAM

## 2025-03-13 RX ORDER — PRENATAL VIT/IRON FUM/FOLIC AC 27MG-0.8MG
1 TABLET ORAL DAILY
Qty: 90 TABLET | Refills: 3 | Status: SHIPPED | OUTPATIENT
Start: 2025-03-13

## 2025-03-13 RX ORDER — METOCLOPRAMIDE HYDROCHLORIDE 5 MG/ML
10 INJECTION INTRAMUSCULAR; INTRAVENOUS ONCE
Status: DISCONTINUED | OUTPATIENT
Start: 2025-03-13 | End: 2025-03-13

## 2025-03-13 RX ORDER — DOXYLAMINE SUCCINATE AND PYRIDOXINE HYDROCHLORIDE, DELAYED RELEASE TABLETS 10 MG/10 MG 10; 10 MG/1; MG/1
1 TABLET, DELAYED RELEASE ORAL 3 TIMES DAILY PRN
Qty: 60 TABLET | Refills: 0 | Status: SHIPPED | OUTPATIENT
Start: 2025-03-13

## 2025-03-13 ASSESSMENT — ACTIVITIES OF DAILY LIVING (ADL)
ADLS_ACUITY_SCORE: 42

## 2025-03-13 ASSESSMENT — COLUMBIA-SUICIDE SEVERITY RATING SCALE - C-SSRS
1. IN THE PAST MONTH, HAVE YOU WISHED YOU WERE DEAD OR WISHED YOU COULD GO TO SLEEP AND NOT WAKE UP?: NO
6. HAVE YOU EVER DONE ANYTHING, STARTED TO DO ANYTHING, OR PREPARED TO DO ANYTHING TO END YOUR LIFE?: NO
2. HAVE YOU ACTUALLY HAD ANY THOUGHTS OF KILLING YOURSELF IN THE PAST MONTH?: NO

## 2025-03-13 NOTE — ED TRIAGE NOTES
Pt believes she is 5-6 weeks pregnant.  She states that she is having some abdominal pain for the past few days. She [place] that [place] was in [place] and is wondering if she picked something up.       Triage Assessment (Adult)       Row Name 03/13/25 0928          Triage Assessment    Airway WDL WDL        Respiratory WDL    Respiratory WDL WDL        Cardiac WDL    Cardiac WDL WDL

## 2025-03-13 NOTE — ED PROVIDER NOTES
"  Emergency Department Note      History of Present Illness     Chief Complaint   Abdominal Pain    HPI   Jaxon Dempsey is a 35 year old female who is currently 5 weeks pregnant and LMP 25, who presents with general abdominal pain, nausea and vomiting for approximately 24 hours. Patient explains going to  on 25 (2 weeks ago) and told she was pregnant. When asked if her pain is more cramping or achy, she says she has \"all over pain\". Reports this is her 4th pregnancy. Also notes a sore throat yesterday and last night, and reports back pain but states this is chronic and unchanged since the past 3 weeks. She says she has never had these symptoms before. Patient is currently nauseous. She denies diarrhea, blood in emesis, fever, chills, cough, dysuria, increased frequency, abdominal surgeries, vaginal bleeding, abnormal vaginal discharge, history of a , alcohol use or any routine medications. Patient reports she traveled to [facility] approximately one month ago. She has not been taking a prenatal    History obtained by formal [facility] .     Independent Historian   None    Review of External Notes   Reviewed urgent care note from 25    Past Medical History     Medical History and Problem List   Gallstones with cholecystitis    Medications   The patient is currently on no regular medications.    Physical Exam     Patient Vitals for the past 24 hrs:   BP Temp Temp src Pulse Resp SpO2 Height Weight   25 0954 95/58 -- -- 70 -- -- -- --   25 0929 94/66 97  F (36.1  C) Temporal 72 18 100 % 1.626 m (5' 4\") 47.2 kg (104 lb 0.9 oz)     Physical Exam  Vital signs and nursing notes reviewed.    General:  Alert and oriented, no acute distress. Resting on chair  Skin: Skin is warm and dry. No rash. No diaphoresis.  HEENT:   Head: Normocephalic, atraumatic. Facial features symmetric.   Eyes: Conjunctiva pink, sclera white. EOMs grossly intact.   Ears: Auricles without lesion, erythema, or " edema.   Nose: Symmetric with no discharge.  Mouth and throat: Lips are moist with no lesions or edema. Posterior oropharynx without erythema, lesions, or exudate  Neck: Normal range of motion. Neck supple with no lymphadenopathy.   CV:  Heart RRR. 2+ radial pulses bilaterally.  Pulm/Chest: Chest wall expansion symmetric with no increased effort of breathing. Lungs clear and equal to auscultation bilaterally.   Abd: Abdomen is soft and mildly tender to palpation in lower abdomen and RUQ   M/S: Moves all extremities spontaneously.  Psych: Normal mood and affect. Behavior is normal.     Diagnostics     Lab Results   Labs Ordered and Resulted from Time of ED Arrival to Time of ED Departure   HCG QUANTITATIVE PREGNANCY - Abnormal       Result Value    hCG Quantitative 15,257 (*)    COMPREHENSIVE METABOLIC PANEL - Abnormal    Sodium 136      Potassium 3.4      Carbon Dioxide (CO2) 20 (*)     Anion Gap 11      Urea Nitrogen 9.3      Creatinine 0.42 (*)     GFR Estimate >90      Calcium 8.6 (*)     Chloride 105      Glucose 93      Alkaline Phosphatase 42      AST 15      ALT 11      Protein Total 6.3 (*)     Albumin 4.1      Bilirubin Total 1.1     LIPASE - Normal    Lipase 22     CBC WITH PLATELETS AND DIFFERENTIAL    WBC Count 5.5      RBC Count 3.95      Hemoglobin 12.8      Hematocrit 35.5      MCV 90      MCH 32.4      MCHC 36.1      RDW 12.3      Platelet Count 240      % Neutrophils 61      % Lymphocytes 27      % Monocytes 8      % Eosinophils 3      % Basophils 1      % Immature Granulocytes 0      NRBCs per 100 WBC 0      Absolute Neutrophils 3.4      Absolute Lymphocytes 1.5      Absolute Monocytes 0.5      Absolute Eosinophils 0.1      Absolute Basophils 0.0      Absolute Immature Granulocytes 0.0      Absolute NRBCs 0.0     INFLUENZA A/B, RSV AND SARS-COV2 PCR     Imaging   US OB < 14 Weeks Single   Final Result   IMPRESSION:    Intrauterine gestational sac with no fetal pole seen. Findings could reflect  early pregnancy. Recommend correlation with beta hCG levels and ultrasound follow-up.         Findings Suspicious, But Not Diagnostic of Pregnancy Failure      CRL< 7mm and no cardiac activity   MSD 16-24 mm and no embryo   Absence of embryo with cardiac activity 7-10 days after previous US showed a gestational sac with a yolk sac    Absence of embryo with cardiac activity 7-13 days after previous US showed a gestational sac without a yolk sac   Absence of embryo >6 weeks after LMP   Enlarged yolk sac (>7mm)   Empty Amnion   Small gestational sac compared to embryo (MSD-CRL <5mm)      Reference: Diagnostic Criteria for Nonviable Pregnancy Early in the First Trimester. Ultrasound Quarterly; 30(1): 3-9, March 2014      US Abdomen Limited   Final Result   IMPRESSION:   1.  Cholelithiasis without sonographic evidence of cholecystitis.              Independent Interpretation   None    ED Course      Medications Administered   Medications - No data to display    Procedures   Procedures     Discussion of Management   None    ED Course   ED Course as of 03/13/25 1358   Thu Mar 13, 2025   0939 I initially assessed the patient and obtained the above history and physical exam.     1250 I reassessed the patient and updated them on results and plan of care.      1251 I rechecked the patient and explained findings. We discussed plan for discharge and patient is in agreement with plan.        Additional Documentation  None    Medical Decision Making / Diagnosis     CMS Diagnoses: None    MIPS       None    MDM   Jaxon Dempsey is a 35 year old female who presents to the emergency department for evaluation of nausea and abdominal pain since yesterday.  See HPI.  Vitals are stable.  On exam, she is nontoxic.  She has mild tenderness to palpation in the right upper quadrant and lower abdomen.  No rebounding or guarding.  Right upper quadrant ultrasound reveals gallstones but no cholecystitis.  Labs are reassuring including CBC  without leukocytosis or anemia, and CMP without electrolyte, metabolic, renal, or hepatic abnormalities, lipase is normal.  hCG is appropriate.  Patient declines IV fluids or COVID or flu testing.  Given some lower abdominal pain as well, OB ultrasound was obtained, however this is too early to identify fetal pole.  This could represent early pregnancy or pregnancy failure.  Results reviewed in detail with patient.  She needs close follow-up with OB/GYN for serial hCG levels.  Patient receptive to this.  She will be started on a prenatal vitamin and provided B6/Unisom for nausea.  Return precautions reviewed.  Low suspicion for bowel obstruction, diverticulitis, appendicitis, or other sinister intra-abdominal surgical etiology.  Patient agreeable to plan and had questions answered.    Disposition   The patient was discharged.     Diagnosis     ICD-10-CM    1. Nausea/vomiting in pregnancy  O21.9       2. Abdominal pain during pregnancy in first trimester  O26.891     R10.9            Discharge Medications   Discharge Medication List as of 3/13/2025  1:03 PM        START taking these medications    Details   Doxylamine-Pyridoxine 10-10 MG TBEC Take 1 tablet by mouth 3 times daily as needed (nausea and vomiting)., Disp-60 tablet, R-0, E-Prescribe           Scribe Disclosure:  I, Yvonne Krishnan, am serving as a scribe at 10:49 AM on 3/13/2025 to document services personally performed by Dinorah Marinelli PA-C based on my observations and the provider's statements to me.     Dinorah Marinelli PA-C on 3/13/2025 at 9:36 PM         Dinorah Marinelli PA-C  03/13/25 2136

## 2025-03-13 NOTE — DISCHARGE INSTRUCTIONS
Take prenatal vitamin daily  Use Unisom/B6 for nausea/vomiting in the early months of your pregnancy  Follow-up with OB/GYN as you have scheduled next month.  However, I would call them today or tomorrow to schedule a lab appointment for repeat beta-hCG sooner than that.  Return to the emergency department if you develop heavy vaginal bleeding, uncontrolled abdominal pain, fevers, persistent vomiting or inability to tolerate oral intake, or any further emergent concerns

## 2025-03-14 LAB — HCG INTACT+B SERPL-ACNC: ABNORMAL MIU/ML

## 2025-03-17 ENCOUNTER — LAB REQUISITION (OUTPATIENT)
Dept: LAB | Facility: CLINIC | Age: 35
End: 2025-03-17

## 2025-03-17 DIAGNOSIS — Z32.01 ENCOUNTER FOR PREGNANCY TEST, RESULT POSITIVE: ICD-10-CM

## 2025-03-17 LAB — HCG INTACT+B SERPL-ACNC: ABNORMAL MIU/ML

## 2025-03-17 PROCEDURE — 84702 CHORIONIC GONADOTROPIN TEST: CPT | Performed by: ADVANCED PRACTICE MIDWIFE

## 2025-03-28 ENCOUNTER — HOSPITAL ENCOUNTER (EMERGENCY)
Facility: CLINIC | Age: 35
Discharge: HOME | End: 2025-03-29
Attending: EMERGENCY MEDICINE | Admitting: EMERGENCY MEDICINE
Payer: COMMERCIAL

## 2025-03-28 DIAGNOSIS — J18.9 PNEUMONIA OF LEFT LOWER LOBE DUE TO INFECTIOUS ORGANISM: ICD-10-CM

## 2025-03-28 PROCEDURE — 85025 COMPLETE CBC W/AUTO DIFF WBC: CPT | Performed by: EMERGENCY MEDICINE

## 2025-03-28 PROCEDURE — 83605 ASSAY OF LACTIC ACID: CPT | Performed by: EMERGENCY MEDICINE

## 2025-03-28 PROCEDURE — 83690 ASSAY OF LIPASE: CPT | Performed by: EMERGENCY MEDICINE

## 2025-03-28 PROCEDURE — 81001 URINALYSIS AUTO W/SCOPE: CPT | Performed by: EMERGENCY MEDICINE

## 2025-03-28 PROCEDURE — 93005 ELECTROCARDIOGRAM TRACING: CPT

## 2025-03-28 PROCEDURE — 82248 BILIRUBIN DIRECT: CPT | Performed by: EMERGENCY MEDICINE

## 2025-03-28 PROCEDURE — 85379 FIBRIN DEGRADATION QUANT: CPT | Performed by: EMERGENCY MEDICINE

## 2025-03-28 PROCEDURE — 99285 EMERGENCY DEPT VISIT HI MDM: CPT | Mod: 25

## 2025-03-28 PROCEDURE — 36415 COLL VENOUS BLD VENIPUNCTURE: CPT | Performed by: EMERGENCY MEDICINE

## 2025-03-28 RX ORDER — FENTANYL CITRATE 50 UG/ML
25 INJECTION, SOLUTION INTRAMUSCULAR; INTRAVENOUS ONCE
Status: COMPLETED | OUTPATIENT
Start: 2025-03-29 | End: 2025-03-29

## 2025-03-28 RX ORDER — ONDANSETRON 2 MG/ML
4 INJECTION INTRAMUSCULAR; INTRAVENOUS ONCE
Status: COMPLETED | OUTPATIENT
Start: 2025-03-29 | End: 2025-03-29

## 2025-03-29 ENCOUNTER — APPOINTMENT (OUTPATIENT)
Dept: GENERAL RADIOLOGY | Facility: CLINIC | Age: 35
End: 2025-03-29
Attending: EMERGENCY MEDICINE
Payer: COMMERCIAL

## 2025-03-29 ENCOUNTER — APPOINTMENT (OUTPATIENT)
Dept: ULTRASOUND IMAGING | Facility: CLINIC | Age: 35
End: 2025-03-29
Attending: EMERGENCY MEDICINE
Payer: COMMERCIAL

## 2025-03-29 VITALS
TEMPERATURE: 99.1 F | WEIGHT: 102 LBS | HEART RATE: 83 BPM | BODY MASS INDEX: 17.51 KG/M2 | SYSTOLIC BLOOD PRESSURE: 105 MMHG | OXYGEN SATURATION: 100 % | DIASTOLIC BLOOD PRESSURE: 64 MMHG | RESPIRATION RATE: 18 BRPM

## 2025-03-29 LAB
ALBUMIN SERPL BCG-MCNC: 3.6 G/DL (ref 3.5–5.2)
ALBUMIN UR-MCNC: NEGATIVE MG/DL
ALP SERPL-CCNC: 98 U/L (ref 40–150)
ALT SERPL W P-5'-P-CCNC: 17 U/L (ref 0–50)
ANION GAP SERPL CALCULATED.3IONS-SCNC: 12 MMOL/L (ref 7–15)
APPEARANCE UR: CLEAR
AST SERPL W P-5'-P-CCNC: 18 U/L (ref 0–45)
BASOPHILS # BLD AUTO: 0 10E3/UL (ref 0–0.2)
BASOPHILS NFR BLD AUTO: 0 %
BILIRUB DIRECT SERPL-MCNC: 0.12 MG/DL (ref 0–0.3)
BILIRUB SERPL-MCNC: 0.3 MG/DL
BILIRUB UR QL STRIP: NEGATIVE
BUN SERPL-MCNC: 3.9 MG/DL (ref 6–20)
CALCIUM SERPL-MCNC: 8.6 MG/DL (ref 8.8–10.4)
CHLORIDE SERPL-SCNC: 103 MMOL/L (ref 98–107)
COLOR UR AUTO: ABNORMAL
CREAT SERPL-MCNC: 0.45 MG/DL (ref 0.51–0.95)
D DIMER PPP FEU-MCNC: 1.13 UG/ML FEU (ref 0–0.5)
EGFRCR SERPLBLD CKD-EPI 2021: >90 ML/MIN/1.73M2
EOSINOPHIL # BLD AUTO: 0.1 10E3/UL (ref 0–0.7)
EOSINOPHIL NFR BLD AUTO: 1 %
ERYTHROCYTE [DISTWIDTH] IN BLOOD BY AUTOMATED COUNT: 12.4 % (ref 10–15)
GLUCOSE SERPL-MCNC: 92 MG/DL (ref 70–99)
GLUCOSE UR STRIP-MCNC: NEGATIVE MG/DL
HCO3 SERPL-SCNC: 23 MMOL/L (ref 22–29)
HCT VFR BLD AUTO: 32.2 % (ref 35–47)
HGB BLD-MCNC: 10.9 G/DL (ref 11.7–15.7)
HGB UR QL STRIP: NEGATIVE
HOLD SPECIMEN: NORMAL
HOLD SPECIMEN: NORMAL
IMM GRANULOCYTES # BLD: 0.1 10E3/UL
IMM GRANULOCYTES NFR BLD: 1 %
KETONES UR STRIP-MCNC: NEGATIVE MG/DL
LACTATE SERPL-SCNC: 0.6 MMOL/L (ref 0.7–2)
LEUKOCYTE ESTERASE UR QL STRIP: NEGATIVE
LIPASE SERPL-CCNC: 16 U/L (ref 13–60)
LYMPHOCYTES # BLD AUTO: 1.1 10E3/UL (ref 0.8–5.3)
LYMPHOCYTES NFR BLD AUTO: 11 %
MCH RBC QN AUTO: 31 PG (ref 26.5–33)
MCHC RBC AUTO-ENTMCNC: 33.9 G/DL (ref 31.5–36.5)
MCV RBC AUTO: 92 FL (ref 78–100)
MONOCYTES # BLD AUTO: 1.2 10E3/UL (ref 0–1.3)
MONOCYTES NFR BLD AUTO: 11 %
NEUTROPHILS # BLD AUTO: 7.8 10E3/UL (ref 1.6–8.3)
NEUTROPHILS NFR BLD AUTO: 76 %
NITRATE UR QL: NEGATIVE
NRBC # BLD AUTO: 0 10E3/UL
NRBC BLD AUTO-RTO: 0 /100
PH UR STRIP: 7.5 [PH] (ref 5–7)
PLATELET # BLD AUTO: 363 10E3/UL (ref 150–450)
POTASSIUM SERPL-SCNC: 3.5 MMOL/L (ref 3.4–5.3)
PROT SERPL-MCNC: 6.9 G/DL (ref 6.4–8.3)
RBC # BLD AUTO: 3.52 10E6/UL (ref 3.8–5.2)
RBC URINE: <1 /HPF
SODIUM SERPL-SCNC: 138 MMOL/L (ref 135–145)
SP GR UR STRIP: 1.01 (ref 1–1.03)
UROBILINOGEN UR STRIP-MCNC: NORMAL MG/DL
WBC # BLD AUTO: 10.3 10E3/UL (ref 4–11)
WBC URINE: 1 /HPF

## 2025-03-29 PROCEDURE — 76801 OB US < 14 WKS SINGLE FETUS: CPT

## 2025-03-29 PROCEDURE — 250N000011 HC RX IP 250 OP 636: Performed by: EMERGENCY MEDICINE

## 2025-03-29 PROCEDURE — 71046 X-RAY EXAM CHEST 2 VIEWS: CPT

## 2025-03-29 PROCEDURE — 96374 THER/PROPH/DIAG INJ IV PUSH: CPT

## 2025-03-29 PROCEDURE — 250N000013 HC RX MED GY IP 250 OP 250 PS 637: Performed by: EMERGENCY MEDICINE

## 2025-03-29 PROCEDURE — 93970 EXTREMITY STUDY: CPT

## 2025-03-29 PROCEDURE — 76775 US EXAM ABDO BACK WALL LIM: CPT

## 2025-03-29 RX ORDER — AMOXICILLIN 500 MG/1
1000 CAPSULE ORAL 3 TIMES DAILY
Qty: 30 CAPSULE | Refills: 0 | Status: SHIPPED | OUTPATIENT
Start: 2025-03-29 | End: 2025-04-03

## 2025-03-29 RX ORDER — ACETAMINOPHEN 500 MG
500 TABLET ORAL EVERY 6 HOURS PRN
Qty: 30 TABLET | Refills: 0 | Status: SHIPPED | OUTPATIENT
Start: 2025-03-29 | End: 2025-04-05

## 2025-03-29 RX ORDER — AZITHROMYCIN 250 MG/1
TABLET, FILM COATED ORAL
Qty: 6 TABLET | Refills: 0 | Status: SHIPPED | OUTPATIENT
Start: 2025-03-29 | End: 2025-04-03

## 2025-03-29 RX ORDER — ACETAMINOPHEN 500 MG
500 TABLET ORAL ONCE
Status: COMPLETED | OUTPATIENT
Start: 2025-03-29 | End: 2025-03-29

## 2025-03-29 RX ADMIN — ACETAMINOPHEN 500 MG: 500 TABLET ORAL at 01:12

## 2025-03-29 RX ADMIN — ONDANSETRON 4 MG: 2 INJECTION, SOLUTION INTRAMUSCULAR; INTRAVENOUS at 00:20

## 2025-03-29 ASSESSMENT — ACTIVITIES OF DAILY LIVING (ADL)
ADLS_ACUITY_SCORE: 42
ADLS_ACUITY_SCORE: 42

## 2025-03-29 NOTE — ED PROVIDER NOTES
Emergency Department Note      History of Present Illness     Chief Complaint   Flank Pain    History obtained through [facility]      HPI   Jaxon Dempsey is a 35 year old female , nearly 7 weeks pregnant who presents to the ED via EMS with left sided flank pain.  Patient reports she was seen in the hospital and diagnosed with influenza B recently on 3/22 and after she left the hospital, 4 days ago developed intermittent left sided flank pain. She states it has gone away earlier in the week but today the pain is constant. She reports a cough, constipation and fever throughout the week. She currently feels bloated and nauseous. She took Tylenol without relief and received a 500mL bolus en route. She denies chest pain, shortness of breath, vomiting, dysuria, vaginal discharge or vaginal bleeding. She denies history of kidney stones.       Independent Historian   None    Review of External Notes   I reviewed the transvaginal ultrasound from 3/25/25   1. Early single living intrauterine pregnancy with sonographic gestational age of 6 weeks 6 days, correlating well with the GA by LMP.   2. No yolk sac is identified, although visualization is limited by the lack of transvaginal imaging.     Past Medical History     Medical History and Problem List   Calculus of gallbladder with cholecystitis   Cholelithiasis without obstruction   HPV    Medications   Albuterol   Macrobid   Deltasone   Colace   Pepcid   Miralax     Surgical History   The patient has no pertinent past surgical history.     Physical Exam     Patient Vitals for the past 24 hrs:   BP Temp Pulse Resp SpO2 Weight   25 0030 103/70 -- 94 -- -- --   25 0029 -- -- 84 18 -- 46.3 kg (102 lb)   25 0028 -- -- -- -- 100 % --   25 0013 104/66 -- -- -- 97 % --   25 2358 109/68 -- -- -- 100 % --   25 1130 -- 99.1  F (37.3  C) -- -- -- --     Physical Exam  Nursing note and vitals reviewed.  Constitutional: Well nourished.  Resting comfortably.   Eyes: Conjunctiva normal.  Pupils are equal, round, and reactive to light.   ENT: Nose normal. Mucous membranes pink and moist.    Neck: Normal range of motion.  CVS: Normal rate, regular rhythm.  Normal heart sounds.  Pulmonary: Lungs clear to auscultation bilaterally. No wheezes/rales/rhonchi.  GI: Abdomen soft. Nontender, nondistended. No rigidity or guarding.  L. CVA tenderness  MSK: No calf tenderness or swelling.  Neuro: Alert. Follows simple commands.  Skin: Skin is warm and dry. No rash noted.   Psychiatric: Normal affect.       Diagnostics     Lab Results   Labs Ordered and Resulted from Time of ED Arrival to Time of ED Departure   BASIC METABOLIC PANEL - Abnormal       Result Value    Sodium 138      Potassium 3.5      Chloride 103      Carbon Dioxide (CO2) 23      Anion Gap 12      Urea Nitrogen 3.9 (*)     Creatinine 0.45 (*)     GFR Estimate >90      Calcium 8.6 (*)     Glucose 92     ROUTINE UA WITH MICROSCOPIC - Abnormal    Color Urine Light Yellow      Appearance Urine Clear      Glucose Urine Negative      Bilirubin Urine Negative      Ketones Urine Negative      Specific Gravity Urine 1.008      Blood Urine Negative      pH Urine 7.5 (*)     Protein Albumin Urine Negative      Urobilinogen Urine Normal      Nitrite Urine Negative      Leukocyte Esterase Urine Negative      RBC Urine <1      WBC Urine 1     CBC WITH PLATELETS AND DIFFERENTIAL - Abnormal    WBC Count 10.3      RBC Count 3.52 (*)     Hemoglobin 10.9 (*)     Hematocrit 32.2 (*)     MCV 92      MCH 31.0      MCHC 33.9      RDW 12.4      Platelet Count 363      % Neutrophils 76      % Lymphocytes 11      % Monocytes 11      % Eosinophils 1      % Basophils 0      % Immature Granulocytes 1      NRBCs per 100 WBC 0      Absolute Neutrophils 7.8      Absolute Lymphocytes 1.1      Absolute Monocytes 1.2      Absolute Eosinophils 0.1      Absolute Basophils 0.0      Absolute Immature Granulocytes 0.1      Absolute NRBCs  0.0     LACTIC ACID WHOLE BLOOD - Abnormal    Lactic Acid 0.6 (*)    D DIMER QUANTITATIVE - Abnormal    D-Dimer Quantitative 1.13 (*)    HEPATIC FUNCTION PANEL - Normal    Protein Total 6.9      Albumin 3.6      Bilirubin Total 0.3      Alkaline Phosphatase 98      AST 18      ALT 17      Bilirubin Direct 0.12     LIPASE - Normal    Lipase 16         Imaging   XR Chest 2 Views   Final Result   IMPRESSION: Normal heart size. Airspace consolidation in the posterior left lower lobe consistent with pneumonia. Right lung is clear. No pneumothorax.      US Lower Extremity Venous Duplex Bilateral   Final Result   IMPRESSION:   1.  No deep venous thrombosis in the bilateral lower extremities.      US Renal Limited   Final Result   IMPRESSION:   1.  Normal sonographic appearance of the kidneys.      2.  Equivocal echogenic debris in the urinary bladder, consider correlation with urinalysis.      US OB < 14 Weeks Single   Final Result   IMPRESSION:    1.  Single intrauterine gestation with cardiac activity at 153 bpm, with EDC of 11/08/2025.                EKG   ECG taken at 0021, ECG read at 0021  Normal sinus rhythm   Possible left atrial-enlargement   Rate 88 bpm. OR interval 162 ms. QRS duration 88 ms. QT/QTc 340/411 ms. P-R-T axes 68 52 55.    Independent Interpretation   CXR: LLL PNA developing.    ED Course      Medications Administered   Medications   ondansetron (ZOFRAN) injection 4 mg (4 mg Intravenous $Given 3/29/25 0020)   fentaNYL (PF) (SUBLIMAZE) injection 25 mcg (25 mcg Intravenous Not Given 3/29/25 0022)   acetaminophen (TYLENOL) tablet 500 mg (500 mg Oral $Given 3/29/25 0112)       Procedures   Procedures     Discussion of Management   None    ED Course        Additional Documentation  None    Medical Decision Making / Diagnosis     CMS Diagnoses: None    MIPS       None    [facility]   Jaxon EDWARD Dempsey is a 35 year old female presenting with flank pain.  She recently tested positive for influenza B.  She underwent  extensive workup during her time in the ED.  EKG without ischemic changes.  No active chest pain reported.  She did undergo formal screening D-dimer given flank pain which was elevated.  We did discuss CT utilization to exclude PE though she is declining this at this point in time.  She expressed limitations of duplex lower extremity ultrasound with chest x-ray and cannot exclude PE.  Duplex lower extremity ultrasound without evidence to suggest DVT.  Chest x-ray does show concerns for pneumonia which certainly could be contributing to her pain.  She is not hypoxic nor she septic appearing.  UA without evidence of obvious infection.  Renal ultrasound without evidence to suggest hydronephrosis.  She had no significant abdominal tenderness on exam and I doubt intra-abdominal catastrophe. Pregnancy confirmed today. Stronger suspicion and again patient's pain is more secondary to pneumonia.  Will plan for amoxicillin as well as azithromycin on discharge.  She is not hypoxic nor does she have significant work of breathing with ambulation.  Proper Tylenol dosing discussed with plans for close outpatient follow-up.    Disposition   The patient was discharged.     Diagnosis     ICD-10-CM    1. Pneumonia of left lower lobe due to infectious organism  J18.9            Discharge Medications   Discharge Medication List as of 3/29/2025  2:48 AM        START taking these medications    Details   !! acetaminophen (TYLENOL) 500 MG tablet Take 1 tablet (500 mg) by mouth every 6 hours as needed for pain or fever., Disp-30 tablet, R-0, Local Print      amoxicillin (AMOXIL) 500 MG capsule Take 2 capsules (1,000 mg) by mouth 3 times daily for 5 days., Disp-30 capsule, R-0, Local Print      azithromycin (ZITHROMAX) 250 MG tablet Take 2 tablets (500 mg) by mouth daily for 1 day, THEN 1 tablet (250 mg) daily for 4 days., Disp-6 tablet, R-0, Local Print       !! - Potential duplicate medications found. Please discuss with provider.             Scribe Disclosure:  I, Doc Villanueva, am serving as a scribe at 11:38 PM on 3/28/2025 to document services personally performed by Michelle Jay DO based on my observations and the provider's statements to me.        Michelle Jay,   03/29/25 0312

## 2025-03-29 NOTE — ED TRIAGE NOTES
Pt arrives EMS from home. Pt diagnosed with flu at urgent care and sent home. Patient complaining of flank pain- 10/10 when urinating. Pt is 8 weeks pregnant. Received 500 ml bolus

## 2025-03-31 LAB
ATRIAL RATE - MUSE: 88 BPM
DIASTOLIC BLOOD PRESSURE - MUSE: NORMAL MMHG
INTERPRETATION ECG - MUSE: NORMAL
P AXIS - MUSE: 68 DEGREES
PR INTERVAL - MUSE: 162 MS
QRS DURATION - MUSE: 88 MS
QT - MUSE: 340 MS
QTC - MUSE: 411 MS
R AXIS - MUSE: 52 DEGREES
SYSTOLIC BLOOD PRESSURE - MUSE: NORMAL MMHG
T AXIS - MUSE: 55 DEGREES
VENTRICULAR RATE- MUSE: 88 BPM

## 2025-04-02 ENCOUNTER — LAB REQUISITION (OUTPATIENT)
Dept: LAB | Facility: CLINIC | Age: 35
End: 2025-04-02

## 2025-04-02 DIAGNOSIS — Z34.90 ENCOUNTER FOR SUPERVISION OF NORMAL PREGNANCY, UNSPECIFIED, UNSPECIFIED TRIMESTER: ICD-10-CM

## 2025-04-02 LAB
ABO + RH BLD: NORMAL
BASOPHILS # BLD AUTO: 0.1 10E3/UL (ref 0–0.2)
BASOPHILS NFR BLD AUTO: 1 %
BLD GP AB SCN SERPL QL: NEGATIVE
EOSINOPHIL # BLD AUTO: 0.1 10E3/UL (ref 0–0.7)
EOSINOPHIL NFR BLD AUTO: 1 %
ERYTHROCYTE [DISTWIDTH] IN BLOOD BY AUTOMATED COUNT: 12.7 % (ref 10–15)
EST. AVERAGE GLUCOSE BLD GHB EST-MCNC: 100 MG/DL
HBA1C MFR BLD: 5.1 %
HBV SURFACE AG SERPL QL IA: NONREACTIVE
HCT VFR BLD AUTO: 36.9 % (ref 35–47)
HCV AB SERPL QL IA: NONREACTIVE
HGB BLD-MCNC: 13 G/DL (ref 11.7–15.7)
HIV 1+2 AB+HIV1 P24 AG SERPL QL IA: NONREACTIVE
IMM GRANULOCYTES # BLD: 0.1 10E3/UL
IMM GRANULOCYTES NFR BLD: 1 %
LYMPHOCYTES # BLD AUTO: 1.9 10E3/UL (ref 0.8–5.3)
LYMPHOCYTES NFR BLD AUTO: 19 %
MCH RBC QN AUTO: 32.4 PG (ref 26.5–33)
MCHC RBC AUTO-ENTMCNC: 35.2 G/DL (ref 31.5–36.5)
MCV RBC AUTO: 92 FL (ref 78–100)
MONOCYTES # BLD AUTO: 0.5 10E3/UL (ref 0–1.3)
MONOCYTES NFR BLD AUTO: 5 %
NEUTROPHILS # BLD AUTO: 7.5 10E3/UL (ref 1.6–8.3)
NEUTROPHILS NFR BLD AUTO: 74 %
NRBC # BLD AUTO: 0 10E3/UL
NRBC BLD AUTO-RTO: 0 /100
PLATELET # BLD AUTO: 656 10E3/UL (ref 150–450)
RBC # BLD AUTO: 4.01 10E6/UL (ref 3.8–5.2)
RUBV IGG SERPL QL IA: 20.4 INDEX
RUBV IGG SERPL QL IA: POSITIVE
SPECIMEN EXP DATE BLD: NORMAL
T PALLIDUM AB SER QL: NONREACTIVE
WBC # BLD AUTO: 10.1 10E3/UL (ref 4–11)

## 2025-04-02 PROCEDURE — 86780 TREPONEMA PALLIDUM: CPT | Performed by: ADVANCED PRACTICE MIDWIFE

## 2025-04-02 PROCEDURE — 87389 HIV-1 AG W/HIV-1&-2 AB AG IA: CPT | Performed by: ADVANCED PRACTICE MIDWIFE

## 2025-04-02 PROCEDURE — 86762 RUBELLA ANTIBODY: CPT | Performed by: ADVANCED PRACTICE MIDWIFE

## 2025-04-02 PROCEDURE — 86850 RBC ANTIBODY SCREEN: CPT | Performed by: ADVANCED PRACTICE MIDWIFE

## 2025-04-02 PROCEDURE — 85004 AUTOMATED DIFF WBC COUNT: CPT | Performed by: ADVANCED PRACTICE MIDWIFE

## 2025-04-02 PROCEDURE — 83036 HEMOGLOBIN GLYCOSYLATED A1C: CPT | Performed by: ADVANCED PRACTICE MIDWIFE

## 2025-04-02 PROCEDURE — 86803 HEPATITIS C AB TEST: CPT | Performed by: ADVANCED PRACTICE MIDWIFE

## 2025-04-02 PROCEDURE — 85018 HEMOGLOBIN: CPT | Performed by: ADVANCED PRACTICE MIDWIFE

## 2025-04-02 PROCEDURE — 87340 HEPATITIS B SURFACE AG IA: CPT | Performed by: ADVANCED PRACTICE MIDWIFE

## 2025-04-02 PROCEDURE — 86900 BLOOD TYPING SEROLOGIC ABO: CPT | Performed by: ADVANCED PRACTICE MIDWIFE

## 2025-04-24 ENCOUNTER — HOSPITAL ENCOUNTER (EMERGENCY)
Facility: CLINIC | Age: 35
Discharge: HOME | End: 2025-04-24
Attending: EMERGENCY MEDICINE
Payer: COMMERCIAL

## 2025-04-24 ENCOUNTER — APPOINTMENT (OUTPATIENT)
Dept: ULTRASOUND IMAGING | Facility: CLINIC | Age: 35
End: 2025-04-24
Attending: EMERGENCY MEDICINE
Payer: COMMERCIAL

## 2025-04-24 VITALS
OXYGEN SATURATION: 100 % | RESPIRATION RATE: 18 BRPM | SYSTOLIC BLOOD PRESSURE: 113 MMHG | BODY MASS INDEX: 20.27 KG/M2 | TEMPERATURE: 97 F | HEIGHT: 61 IN | DIASTOLIC BLOOD PRESSURE: 65 MMHG | HEART RATE: 79 BPM | WEIGHT: 107.36 LBS

## 2025-04-24 DIAGNOSIS — R10.9 ABDOMINAL PAIN DURING PREGNANCY IN FIRST TRIMESTER: ICD-10-CM

## 2025-04-24 DIAGNOSIS — K83.8 BILIARY SLUDGE: ICD-10-CM

## 2025-04-24 DIAGNOSIS — O26.891 ABDOMINAL PAIN DURING PREGNANCY IN FIRST TRIMESTER: ICD-10-CM

## 2025-04-24 LAB
ALBUMIN SERPL BCG-MCNC: 4 G/DL (ref 3.5–5.2)
ALP SERPL-CCNC: 56 U/L (ref 40–150)
ALT SERPL W P-5'-P-CCNC: 11 U/L (ref 0–50)
ANION GAP SERPL CALCULATED.3IONS-SCNC: 10 MMOL/L (ref 7–15)
AST SERPL W P-5'-P-CCNC: 13 U/L (ref 0–45)
BASOPHILS # BLD AUTO: 0.1 10E3/UL (ref 0–0.2)
BASOPHILS NFR BLD AUTO: 1 %
BILIRUB SERPL-MCNC: 0.7 MG/DL
BUN SERPL-MCNC: 10.5 MG/DL (ref 6–20)
CALCIUM SERPL-MCNC: 8.8 MG/DL (ref 8.8–10.4)
CHLORIDE SERPL-SCNC: 102 MMOL/L (ref 98–107)
CREAT SERPL-MCNC: 0.47 MG/DL (ref 0.51–0.95)
EGFRCR SERPLBLD CKD-EPI 2021: >90 ML/MIN/1.73M2
EOSINOPHIL # BLD AUTO: 0.2 10E3/UL (ref 0–0.7)
EOSINOPHIL NFR BLD AUTO: 3 %
ERYTHROCYTE [DISTWIDTH] IN BLOOD BY AUTOMATED COUNT: 14.6 % (ref 10–15)
GLUCOSE SERPL-MCNC: 70 MG/DL (ref 70–99)
HCO3 SERPL-SCNC: 23 MMOL/L (ref 22–29)
HCT VFR BLD AUTO: 37.9 % (ref 35–47)
HGB BLD-MCNC: 13.1 G/DL (ref 11.7–15.7)
IMM GRANULOCYTES # BLD: 0 10E3/UL
IMM GRANULOCYTES NFR BLD: 0 %
LIPASE SERPL-CCNC: 22 U/L (ref 13–60)
LYMPHOCYTES # BLD AUTO: 1.5 10E3/UL (ref 0.8–5.3)
LYMPHOCYTES NFR BLD AUTO: 20 %
MCH RBC QN AUTO: 31.9 PG (ref 26.5–33)
MCHC RBC AUTO-ENTMCNC: 34.6 G/DL (ref 31.5–36.5)
MCV RBC AUTO: 92 FL (ref 78–100)
MONOCYTES # BLD AUTO: 0.6 10E3/UL (ref 0–1.3)
MONOCYTES NFR BLD AUTO: 8 %
NEUTROPHILS # BLD AUTO: 5.3 10E3/UL (ref 1.6–8.3)
NEUTROPHILS NFR BLD AUTO: 68 %
NRBC # BLD AUTO: 0 10E3/UL
NRBC BLD AUTO-RTO: 0 /100
PLATELET # BLD AUTO: 190 10E3/UL (ref 150–450)
POTASSIUM SERPL-SCNC: 3.8 MMOL/L (ref 3.4–5.3)
PROT SERPL-MCNC: 6.7 G/DL (ref 6.4–8.3)
RBC # BLD AUTO: 4.11 10E6/UL (ref 3.8–5.2)
SODIUM SERPL-SCNC: 135 MMOL/L (ref 135–145)
WBC # BLD AUTO: 7.8 10E3/UL (ref 4–11)

## 2025-04-24 PROCEDURE — 76705 ECHO EXAM OF ABDOMEN: CPT

## 2025-04-24 PROCEDURE — 82310 ASSAY OF CALCIUM: CPT | Performed by: EMERGENCY MEDICINE

## 2025-04-24 PROCEDURE — 85025 COMPLETE CBC W/AUTO DIFF WBC: CPT | Performed by: EMERGENCY MEDICINE

## 2025-04-24 PROCEDURE — 76801 OB US < 14 WKS SINGLE FETUS: CPT

## 2025-04-24 PROCEDURE — 36415 COLL VENOUS BLD VENIPUNCTURE: CPT | Performed by: EMERGENCY MEDICINE

## 2025-04-24 PROCEDURE — 83690 ASSAY OF LIPASE: CPT | Performed by: EMERGENCY MEDICINE

## 2025-04-24 PROCEDURE — 99285 EMERGENCY DEPT VISIT HI MDM: CPT | Mod: 25

## 2025-04-24 ASSESSMENT — COLUMBIA-SUICIDE SEVERITY RATING SCALE - C-SSRS
1. IN THE PAST MONTH, HAVE YOU WISHED YOU WERE DEAD OR WISHED YOU COULD GO TO SLEEP AND NOT WAKE UP?: NO
2. HAVE YOU ACTUALLY HAD ANY THOUGHTS OF KILLING YOURSELF IN THE PAST MONTH?: NO
6. HAVE YOU EVER DONE ANYTHING, STARTED TO DO ANYTHING, OR PREPARED TO DO ANYTHING TO END YOUR LIFE?: NO

## 2025-04-24 ASSESSMENT — ACTIVITIES OF DAILY LIVING (ADL)
ADLS_ACUITY_SCORE: 42
ADLS_ACUITY_SCORE: 42

## 2025-04-24 NOTE — Clinical Note
Jaxon Dempsey was seen and treated in our emergency department on 4/24/2025.  She may return to work on 04/24/2025.       If you have any questions or concerns, please don't hesitate to call.      Sebastián Franco MD

## 2025-04-24 NOTE — ED PROVIDER NOTES
"  Emergency Department Note      History of Present Illness     Chief Complaint   Abdominal Pain      HPI   Jaxon Dempsey is a 35 year old female at 11w5d (pt follows with felicia burgos and has had reassuring US thus far) without any known problems in the pregnancy who presents with 2 weeks of upper abdominal pain that progressed to entire abdomen. Sx worse at night but constant throughout the day. NO change with eating but maybe a bit better. Normal BMs but feels a bit constipated. NO dysuria. No hematuria. NO vaginal bleeding or abnormal fluid/discharge. No fever. Pt not taking medications for her sx. No h/o of abdominal surgeries. Pt thinks she had h pylori in the past. Pt takes pepcid every day as well as gas medication without relief.     Independent Historian   None    Review of External Notes     Past Medical History     Medical History and Problem List   Past Medical History:   Diagnosis Date    Gall stones 2021       Medications   acetaminophen (TYLENOL) 325 MG tablet  diclofenac (VOLTAREN) 1 % topical gel  docusate sodium (COLACE) 100 MG capsule  Doxylamine-Pyridoxine 10-10 MG TBEC  famotidine (PEPCID) 20 MG tablet  guaiFENesin-dextromethorphan (ROBITUSSIN DM) 100-10 MG/5ML syrup  hydrocortisone 2.5 % cream  ibuprofen (ADVIL/MOTRIN) 600 MG tablet  ondansetron (ZOFRAN ODT) 4 MG ODT tab  polyethylene glycol (MIRALAX) 17 g packet  Prenatal Vit-Fe Fumarate-FA (PRENATAL MULTIVITAMIN W/IRON) 27-0.8 MG tablet        Surgical History   No past surgical history on file.    Physical Exam     Patient Vitals for the past 24 hrs:   BP Temp Temp src Pulse Resp SpO2 Height Weight   04/24/25 1142 113/65 -- -- 79 18 100 % -- --   04/24/25 0926 104/71 97  F (36.1  C) Tympanic 74 18 100 % 1.549 m (5' 1\") 48.7 kg (107 lb 5.8 oz)     Physical Exam  VS: Reviewed per above  HENT: Mucous membranes moist  EYES: sclera anicteric  CV: Rate as noted,  regular rhythm.   RESP: Effort normal. Breath sounds are normal " bilaterally.  GI: mild general tenderness without /rebound/guarding, not distended.  NEURO: Alert, moving all extremities  MSK: No deformity of the extremities  SKIN: Warm and dry        Diagnostics     Lab Results   Labs Ordered and Resulted from Time of ED Arrival to Time of ED Departure   COMPREHENSIVE METABOLIC PANEL - Abnormal       Result Value    Sodium 135      Potassium 3.8      Carbon Dioxide (CO2) 23      Anion Gap 10      Urea Nitrogen 10.5      Creatinine 0.47 (*)     GFR Estimate >90      Calcium 8.8      Chloride 102      Glucose 70      Alkaline Phosphatase 56      AST 13      ALT 11      Protein Total 6.7      Albumin 4.0      Bilirubin Total 0.7     LIPASE - Normal    Lipase 22     CBC WITH PLATELETS AND DIFFERENTIAL    WBC Count 7.8      RBC Count 4.11      Hemoglobin 13.1      Hematocrit 37.9      MCV 92      MCH 31.9      MCHC 34.6      RDW 14.6      Platelet Count 190      % Neutrophils 68      % Lymphocytes 20      % Monocytes 8      % Eosinophils 3      % Basophils 1      % Immature Granulocytes 0      NRBCs per 100 WBC 0      Absolute Neutrophils 5.3      Absolute Lymphocytes 1.5      Absolute Monocytes 0.6      Absolute Eosinophils 0.2      Absolute Basophils 0.1      Absolute Immature Granulocytes 0.0      Absolute NRBCs 0.0         Imaging   US OB < 14 Weeks Single   Preliminary Result   IMPRESSION: Single intrauterine gestation with cardiac activity at 11    weeks 6 days, with EDC of 11/7/2025.         US Abdomen Limited   Final Result   IMPRESSION:   1.  Cholelithiasis. No acute cholecystitis.   2.  Mild dilatation of the right ureteropelvic junction/proximal ureter measuring 5 mm. No right hydronephrosis.                EKG     Independent Interpretation   None    ED Course      Medications Administered   Medications - No data to display    Procedures   Procedures     Discussion of Management   None    ED Course        Additional Documentation  None    Medical Decision Making /  "Diagnosis     CMS Diagnoses: None    MIPS       None    MDM   Jaxon Dempsey is a 35 year old female who presents to the ER for evaluation of 2 weeks of generalized abdominal pain that started in the upper abdomen.  Vital signs reassuring.  Abdominal exam reveals minimal tenderness throughout without peritoneal signs.  Low suspicion for sinister or surgical intra-abdominal pathology.  Pelvic ultrasound reveals living IUP.  Upper abdominal ultrasound with some biliary stones/sludge but no signs of cholecystitis.  Also noted was \"mild dilatation of the right ureteropelvic junction/proximal ureter measuring 5 mm\". She has no urinary symptoms or lateralizing pain, and I suspect this US finding is related to pregnancy related change. Basic labs are also reassuring.   Patient was wondering about H. Pylori infection.  She is already taking famotidine and gas medication at baseline.  She could not provide a stool sample here in the ER for h pylori testing.  I encouraged her to follow-up with primary care or OB/GYN to discuss H. pylori testing further.  I also provided information for general surgery follow-up to review how her biliary stones/sludge might relate to her symptoms today.  Given lack of postprandial symptom exacerbation, lower suspicion that her symptoms are biliary in nature.  Return precautions were discussed prior to discharge.    Disposition   The patient was discharged.     Diagnosis     ICD-10-CM    1. Biliary sludge  K83.8       2. Abdominal pain during pregnancy in first trimester  O26.891     R10.9            Discharge Medications   Discharge Medication List as of 4/24/2025 11:34 AM                   Sebastián Franco MD  04/24/25 1156    "

## 2025-04-24 NOTE — ED TRIAGE NOTES
Pt here 13 weeks pregnant with concerns of abdominal pain. Nausea but no vomiting. No diarrhea but sometimes constipation. Pt concerned that she has hplyori.     Pain is 7/10, not meds at home.      Triage Assessment (Adult)       Row Name 04/24/25 0928          Triage Assessment    Airway WDL WDL        Respiratory WDL    Respiratory WDL WDL        Skin Circulation/Temperature WDL    Skin Circulation/Temperature WDL WDL        Cardiac WDL    Cardiac WDL WDL        Peripheral/Neurovascular WDL    Peripheral Neurovascular WDL WDL        Cognitive/Neuro/Behavioral WDL    Cognitive/Neuro/Behavioral WDL WDL

## 2025-08-19 ENCOUNTER — LAB REQUISITION (OUTPATIENT)
Dept: LAB | Facility: CLINIC | Age: 35
End: 2025-08-19

## 2025-08-19 DIAGNOSIS — Z36.89 ENCOUNTER FOR OTHER SPECIFIED ANTENATAL SCREENING: ICD-10-CM

## 2025-08-19 LAB
ERYTHROCYTE [DISTWIDTH] IN BLOOD BY AUTOMATED COUNT: 13.7 % (ref 10–15)
HCT VFR BLD AUTO: 35.2 % (ref 35–47)
HGB BLD-MCNC: 12.5 G/DL (ref 11.7–15.7)
MCH RBC QN AUTO: 33.7 PG (ref 26.5–33)
MCHC RBC AUTO-ENTMCNC: 35.5 G/DL (ref 31.5–36.5)
MCV RBC AUTO: 94.9 FL (ref 78–100)
PLATELET # BLD AUTO: 149 10E3/UL (ref 150–450)
RBC # BLD AUTO: 3.71 10E6/UL (ref 3.8–5.2)
WBC # BLD AUTO: 9.63 10E3/UL (ref 4–11)

## 2025-08-19 PROCEDURE — 85018 HEMOGLOBIN: CPT | Performed by: ADVANCED PRACTICE MIDWIFE

## 2025-08-19 PROCEDURE — 86592 SYPHILIS TEST NON-TREP QUAL: CPT | Performed by: ADVANCED PRACTICE MIDWIFE

## 2025-08-20 LAB — RPR SER QL: NONREACTIVE
